# Patient Record
Sex: FEMALE | Race: WHITE | NOT HISPANIC OR LATINO | Employment: FULL TIME | URBAN - METROPOLITAN AREA
[De-identification: names, ages, dates, MRNs, and addresses within clinical notes are randomized per-mention and may not be internally consistent; named-entity substitution may affect disease eponyms.]

---

## 2018-06-27 ENCOUNTER — HOSPITAL ENCOUNTER (OUTPATIENT)
Dept: ULTRASOUND IMAGING | Facility: HOSPITAL | Age: 33
Discharge: HOME | End: 2018-06-27
Attending: OBSTETRICS & GYNECOLOGY | Admitting: OBSTETRICS & GYNECOLOGY

## 2018-07-31 ENCOUNTER — HOSPITAL ENCOUNTER (OUTPATIENT)
Dept: OTHER | Facility: HOSPITAL | Age: 33
Setting detail: SPECIMEN
Discharge: HOME | End: 2018-07-31
Attending: OBSTETRICS & GYNECOLOGY | Admitting: OBSTETRICS & GYNECOLOGY

## 2018-08-13 ENCOUNTER — CONVERSION ENCOUNTER (OUTPATIENT)
Dept: URGENT CARE | Facility: CLINIC | Age: 33
End: 2018-08-13

## 2018-08-13 ENCOUNTER — HOSPITAL ENCOUNTER (OUTPATIENT)
Dept: LAB | Facility: HOSPITAL | Age: 33
Discharge: HOME | End: 2018-08-13
Attending: INTERNAL MEDICINE | Admitting: INTERNAL MEDICINE

## 2018-08-13 LAB
ALBUMIN SERPL-MCNC: 4.7 G/DL (ref 3.5–4.8)
ALBUMIN/GLOB SERPL: 1.6 {RATIO} (ref 1–1.7)
ALP SERPL-CCNC: 50 IU/L (ref 32–91)
ALT SERPL-CCNC: 16 IU/L (ref 14–54)
ANION GAP SERPL CALC-SCNC: 12.7 MMOL/L (ref 10–20)
AST SERPL-CCNC: 21 IU/L (ref 15–41)
BASOPHILS # BLD AUTO: 0.1 10*3/UL (ref 0–0.2)
BASOPHILS NFR BLD AUTO: 1 % (ref 0–2)
BILIRUB SERPL-MCNC: 0.6 MG/DL (ref 0.3–1.2)
BUN SERPL-MCNC: 7 MG/DL (ref 8–20)
BUN/CREAT SERPL: 8.8 (ref 5.4–26.2)
CALCIUM SERPL-MCNC: 9.6 MG/DL (ref 8.9–10.3)
CHLORIDE SERPL-SCNC: 101 MMOL/L (ref 101–111)
CONV CO2: 28 MMOL/L (ref 22–32)
CONV TOTAL PROTEIN: 7.6 G/DL (ref 6.1–7.9)
CREAT UR-MCNC: 0.8 MG/DL (ref 0.4–1)
DIFFERENTIAL METHOD BLD: (no result)
EOSINOPHIL # BLD AUTO: 0.1 10*3/UL (ref 0–0.3)
EOSINOPHIL # BLD AUTO: 1 % (ref 0–3)
ERYTHROCYTE [DISTWIDTH] IN BLOOD BY AUTOMATED COUNT: 13 % (ref 11.5–14.5)
GLOBULIN UR ELPH-MCNC: 2.9 G/DL (ref 2.5–3.8)
GLUCOSE SERPL-MCNC: 99 MG/DL (ref 65–99)
HCT VFR BLD AUTO: 41.5 % (ref 35–49)
HGB BLD-MCNC: 14.2 G/DL (ref 12–15)
LYMPHOCYTES # BLD AUTO: 2.5 10*3/UL (ref 0.8–4.8)
LYMPHOCYTES NFR BLD AUTO: 35 % (ref 18–42)
MCH RBC QN AUTO: 30.2 PG (ref 26–32)
MCHC RBC AUTO-ENTMCNC: 34.3 G/DL (ref 32–36)
MCV RBC AUTO: 88 FL (ref 80–94)
MONOCYTES # BLD AUTO: 0.4 10*3/UL (ref 0.1–1.3)
MONOCYTES NFR BLD AUTO: 5 % (ref 2–11)
NEUTROPHILS # BLD AUTO: 4.2 10*3/UL (ref 2.3–8.6)
NEUTROPHILS NFR BLD AUTO: 58 % (ref 50–75)
NRBC BLD AUTO-RTO: 0 /100{WBCS}
NRBC/RBC NFR BLD MANUAL: 0 10*3/UL
PLATELET # BLD AUTO: 294 10*3/UL (ref 150–450)
PMV BLD AUTO: 8.9 FL (ref 7.4–10.4)
POTASSIUM SERPL-SCNC: 3.7 MMOL/L (ref 3.6–5.1)
RBC # BLD AUTO: 4.71 10*6/UL (ref 4–5.4)
SODIUM SERPL-SCNC: 138 MMOL/L (ref 136–144)
WBC # BLD AUTO: 7.3 10*3/UL (ref 4.5–11.5)

## 2018-08-23 ENCOUNTER — HOSPITAL ENCOUNTER (OUTPATIENT)
Dept: OTHER | Facility: HOSPITAL | Age: 33
Setting detail: SPECIMEN
Discharge: HOME | End: 2018-08-23
Attending: OBSTETRICS & GYNECOLOGY | Admitting: OBSTETRICS & GYNECOLOGY

## 2019-06-03 VITALS
SYSTOLIC BLOOD PRESSURE: 120 MMHG | HEIGHT: 65 IN | WEIGHT: 142 LBS | HEART RATE: 60 BPM | BODY MASS INDEX: 23.66 KG/M2 | DIASTOLIC BLOOD PRESSURE: 74 MMHG | OXYGEN SATURATION: 97 %

## 2019-11-04 PROBLEM — E55.9 VITAMIN D DEFICIENCY: Status: ACTIVE | Noted: 2018-08-13

## 2019-11-05 ENCOUNTER — OFFICE VISIT (OUTPATIENT)
Dept: PSYCHIATRY | Facility: CLINIC | Age: 34
End: 2019-11-05

## 2019-11-05 DIAGNOSIS — F33.0 MILD RECURRENT MAJOR DEPRESSION (HCC): ICD-10-CM

## 2019-11-05 DIAGNOSIS — F41.9 ANXIETY: ICD-10-CM

## 2019-11-05 DIAGNOSIS — F32.81 PMDD (PREMENSTRUAL DYSPHORIC DISORDER): Primary | ICD-10-CM

## 2019-11-05 DIAGNOSIS — R41.840 ATTENTION DEFICIT: ICD-10-CM

## 2019-11-05 PROCEDURE — 90792 PSYCH DIAG EVAL W/MED SRVCS: CPT | Performed by: PHYSICIAN ASSISTANT

## 2019-11-05 RX ORDER — LAMOTRIGINE 25 MG/1
25 TABLET ORAL 2 TIMES DAILY
Qty: 60 TABLET | Refills: 2 | Status: SHIPPED | OUTPATIENT
Start: 2019-11-05 | End: 2020-01-29

## 2019-11-05 NOTE — PROGRESS NOTES
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  Have had anxiety and depression, seasons of life but worse in the last 9 months  No SI  Wednesday women's group through [employer] has helped  Great , no financial issues  Sleeps well at night but has severe daytime fatigue, was given a trial of Provigil    The following portions of the patient's history were reviewed and updated as appropriate: allergies, current medications, past family history, past medical history, past social history, past surgical history and problem list.    PAST PSYCHIATRIC HISTORY  Axis I  Affective/Bipoloar Disorder, Anxiety/Panic Disorder  Axis II  None    PAST OUTPATIENT TREATMENT  Diagnosis treated:  Affective Disorder, Anxiety/Panic Disorder  Treatment Type:  Medication Management  Prior Psychiatric Medications:  Paxil, uncontrollable yawining  Prozac, uncontrollable yawining  Sarafem  Provigil, caused a rash  Support Groups:  Women's group  Sequelae Of Mental Disorder:  job disruption, social isolation, emotional distress      Interval History  No Change    Side Effects  None      Past Medical History:  Past Medical History:   Diagnosis Date   • Anxiety    • PMDD (premenstrual dysphoric disorder)        Social History:  Social History     Socioeconomic History   • Marital status:      Spouse name: Not on file   • Number of  children: Not on file   • Years of education: Not on file   • Highest education level: Not on file   Tobacco Use   • Smoking status: Never Smoker   • Smokeless tobacco: Never Used   Substance and Sexual Activity   • Alcohol use: Yes     Comment: social   • Drug use: No       Family History:  Family History   Problem Relation Age of Onset   • Depression Father    • Post-traumatic stress disorder Father    • Anxiety disorder Father        Past Surgical History:  History reviewed. No pertinent surgical history.    Problem List:  Patient Active Problem List   Diagnosis   • Vitamin D deficiency       Allergy:   Allergies   Allergen Reactions   • Penicillin G Rash        Discontinued Medications:  Medications Discontinued During This Encounter   Medication Reason   • FLUoxetine HCl, PMDD, 10 MG capsule Side effects       Current Medications:   Current Outpatient Medications   Medication Sig Dispense Refill   • azithromycin (ZITHROMAX) 250 MG tablet Take 2 tablets the first day, then 1 tablet daily for 4 days. 6 tablet 0   • lamoTRIgine (LAMICTAL) 25 MG tablet Take 1 tablet by mouth 2 (Two) Times a Day. 60 tablet 2   • predniSONE (DELTASONE) 20 MG tablet 2 po qd for 5 days 10 tablet 0   • promethazine-dextromethorphan (PROMETHAZINE-DM) 6.25-15 MG/5ML syrup Take 5 mL by mouth 4 (Four) Times a Day As Needed for Cough. 100 mL 0   • Vortioxetine HBr (TRINTELLIX) 5 MG tablet Take 5 mg by mouth Daily With Breakfast. 35 tablet 0     No current facility-administered medications for this visit.          Review of Symptoms:    Psychiatric/Behavioral: Negative for agitation, behavioral problems, confusion, hallucinations, self-injury, sleep disturbance and suicidal ideas. The patient is mildly nervous/anxious with decreased concentration and dysphoric mood but is not hyperactive.        Physical Exam:   not currently breastfeeding.    Mental Status Exam:   Hygiene:   good  Cooperation:  Cooperative  Eye Contact:  Good  Psychomotor  Behavior:  Appropriate  Affect:  Appropriate  Mood: anxious  Hopelessness: Denies  Speech:  Normal  Thought Process:  Goal directed  Thought Content:  Normal  Suicidal:  None  Homicidal:  None  Hallucinations:  None  Delusion:  None  Memory:  Intact  Orientation:  Person, Place, Time and Situation  Reliability:  good  Insight:  Good  Judgement:  Good  Impulse Control:  Good  Physical/Medical Issues:  No        PHQ-9 Depression Screening  Little interest or pleasure in doing things? 1   Feeling down, depressed, or hopeless? 1   Trouble falling or staying asleep, or sleeping too much? 0   Feeling tired or having little energy? 1   Poor appetite or overeating? 0   Feeling bad about yourself - or that you are a failure or have let yourself or your family down? 1   Trouble concentrating on things, such as reading the newspaper or watching television? 3   Moving or speaking so slowly that other people could have noticed? Or the opposite - being so fidgety or restless that you have been moving around a lot more than usual? 0   Thoughts that you would be better off dead, or of hurting yourself in some way? 0   PHQ-9 Total Score 7   If you checked off any problems, how difficult have these problems made it for you to do your work, take care of things at home, or get along with other people? Somewhat difficult           Never smoker    I advised Marjorie of the risks of tobacco use.     Lab Results:   No visits with results within 3 Month(s) from this visit.   Latest known visit with results is:   Hospital Outpatient Visit on 08/13/2018   Component Date Value Ref Range Status   • WBC 08/13/2018 7.3  4.5 - 11.5 10*3/uL Final   • RBC 08/13/2018 4.71  4.00 - 5.40 10*6/uL Final   • Hemoglobin 08/13/2018 14.2  12.0 - 15.0 g/dL Final   • Hematocrit 08/13/2018 41.5  35 - 49 % Final   • MCV 08/13/2018 88.0  80 - 94 fL Final   • MCH 08/13/2018 30.2  26 - 32 pg Final   • MCHC 08/13/2018 34.3  32 - 36 g/dL Final   • RDW 08/13/2018 13.0   11.5 - 14.5 % Final   • Platelets 08/13/2018 294  150 - 450 10*3/uL Final   • MPV 08/13/2018 8.9  7.4 - 10.4 fL Final   • Differential Type 08/13/2018 AUTO   Final   • Neutrophils Absolute 08/13/2018 4.2  2.3 - 8.6 10*3/uL Final   • Lymphocytes Absolute 08/13/2018 2.5  0.8 - 4.8 10*3/uL Final   • Monocytes Absolute 08/13/2018 0.4  0.1 - 1.3 10*3/uL Final   • Eosinophils Absolute 08/13/2018 0.1  0.0 - 0.3 10*3/uL Final   • Basophils Absolute 08/13/2018 0.1  0 - 0.2 10*3/uL Final   • Neutrophil Rel % 08/13/2018 58  50 - 75 % Final   • Lymphocyte Rel % 08/13/2018 35  18 - 42 % Final   • Monocyte Rel % 08/13/2018 5  2 - 11 % Final   • Eosinophil Rel % 08/13/2018 1  0 - 3 % Final   • Basophil Rel % 08/13/2018 1  0 - 2 % Final   • nRBC 08/13/2018 0  0 /100[WBCs] Final   • Absolute nRBC 08/13/2018 0  10*3/uL Final   • Sodium 08/13/2018 138  136 - 144 mmol/L Final   • Potassium 08/13/2018 3.7  3.6 - 5.1 mmol/L Final   • Chloride 08/13/2018 101  101 - 111 mmol/L Final   • CO2 08/13/2018 28  22 - 32 mmol/L Final   • Glucose 08/13/2018 99  65 - 99 mg/dL Final   • BUN 08/13/2018 7* 8 - 20 mg/dL Final   • Creatinine 08/13/2018 0.8  0.4 - 1.0 mg/dl Final   • Calcium 08/13/2018 9.6  8.9 - 10.3 mg/dL Final   • Total Protein 08/13/2018 7.6  6.1 - 7.9 g/dL Final   • Albumin 08/13/2018 4.7  3.5 - 4.8 g/dL Final   • Total Bilirubin 08/13/2018 0.6  0.3 - 1.2 mg/dL Final   • Alkaline Phosphatase 08/13/2018 50  32 - 91 IU/L Final   • AST (SGOT) 08/13/2018 21  15 - 41 IU/L Final   • ALT (SGPT) 08/13/2018 16  14 - 54 IU/L Final   • Anion Gap 08/13/2018 12.7  10 - 20 Final   • BUN/Creatinine Ratio 08/13/2018 8.8  5.4 - 26.2 Final   • GFR MDRD Non  08/13/2018 >60  >60 mL/min/1.73m2 Final   • GFR MDRD  08/13/2018 >60  >60 mL/min/1.73m2 Final   • Globulin 08/13/2018 2.9  2.5 - 3.8 G/dL Final   • A/G Ratio 08/13/2018 1.6  1.0 - 1.7 Final       Assessment/Plan   Problems Addressed this Visit     None      Visit  Diagnoses     PMDD (premenstrual dysphoric disorder)    -  Primary    Relevant Medications    lamoTRIgine (LAMICTAL) 25 MG tablet    Vortioxetine HBr (TRINTELLIX) 5 MG tablet    Mild recurrent major depression (CMS/HCC)        Relevant Medications    lamoTRIgine (LAMICTAL) 25 MG tablet    Vortioxetine HBr (TRINTELLIX) 5 MG tablet    Attention deficit        Anxiety        Relevant Medications    Vortioxetine HBr (TRINTELLIX) 5 MG tablet          Visit Diagnoses:    ICD-10-CM ICD-9-CM   1. PMDD (premenstrual dysphoric disorder) F32.81 625.4   2. Mild recurrent major depression (CMS/HCC) F33.0 296.31   3. Attention deficit R41.840 799.51   4. Anxiety F41.9 300.00       TREATMENT PLAN/GOALS: Continue supportive psychotherapy efforts and medications as indicated. Treatment and medication options discussed during today's visit. Patient ackowledged and verbally consented to continue with current treatment plan and was educated on the importance of compliance with treatment and follow-up appointments.    MEDICATION ISSUES:  INSPECT reviewed as expected  Discussed medication options and treatment plan of prescribed medication as well as the risks, benefits, and side effects including potential falls, possible impaired driving and metabolic adversities among others. Patient is agreeable to call the office with any worsening of symptoms or onset of side effects. Patient is agreeable to call 911 or go to the nearest ER should he/she begin having SI/HI. No medication side effects or related complaints today.     Patient has significant PMDD symptoms 7 days prior to her onset of menses.  She has had the same significant side effect with every SSRI, which is excessive yawning.  She has a lot of difficulty with attention and focus and staying on task, worry about losing her job.  Discussed having her tested for ADD, she will call the locations on a referral list to see if they take her insurance for neuropsych testing.  We can  send a referral if she finds some more to get it done.  Discussed [facility] testing in the future.  Trial of Trintellix 5 mg daily, call in 1 month with a progress report.  Trial of Lamictal 25 mg twice daily 10 days prior to her onset of menses.  Consider adding attention deficit medication in the future.    MEDS ORDERED DURING VISIT:  New Medications Ordered This Visit   Medications   • lamoTRIgine (LAMICTAL) 25 MG tablet     Sig: Take 1 tablet by mouth 2 (Two) Times a Day.     Dispense:  60 tablet     Refill:  2   • Vortioxetine HBr (TRINTELLIX) 5 MG tablet     Sig: Take 5 mg by mouth Daily With Breakfast.     Dispense:  35 tablet     Refill:  0     Samples given       Return in about 2 months (around 1/5/2020).         This document has been electronically signed by Elizabeth Damon PA-C  November 5, 2019 11:03 AM

## 2019-12-03 ENCOUNTER — TELEPHONE (OUTPATIENT)
Dept: PSYCHIATRY | Facility: CLINIC | Age: 34
End: 2019-12-03

## 2019-12-03 NOTE — TELEPHONE ENCOUNTER
Patient called stating this was her 4 week marilee that you asked her to call and let you know how the TRINTELLIX is doing for her.     She is stating that ever since she started the medicine she is having bad headaches every night starting about 7:00 pm on the left side of her head.    Her menstrual cycle she just had was horrific with severe cramps (as bad as beginning stages of labor), very nauseas and said she woke up having diarrhea 7 or 8 times the following morning.        She said she has been experiencing insomnia ever since taking it and no Sex drive.     She does not feel that it touched her anxiety at all.     The LAMICTAL is working fine for her.

## 2019-12-04 NOTE — TELEPHONE ENCOUNTER
Then tell her to stop the Trintellix and try the Lamictal alone.  The Lamictal dosage can be increased if needed.

## 2019-12-04 NOTE — TELEPHONE ENCOUNTER
Notified patient to stop trintellix and and keep taking lamotrigine and can be increased if needed

## 2020-01-29 DIAGNOSIS — F32.81 PMDD (PREMENSTRUAL DYSPHORIC DISORDER): ICD-10-CM

## 2020-01-29 DIAGNOSIS — F33.0 MILD RECURRENT MAJOR DEPRESSION (HCC): ICD-10-CM

## 2020-01-29 RX ORDER — LAMOTRIGINE 25 MG/1
TABLET ORAL
Qty: 60 TABLET | Refills: 2 | Status: SHIPPED | OUTPATIENT
Start: 2020-01-29 | End: 2020-07-27 | Stop reason: SDUPTHER

## 2020-04-01 ENCOUNTER — TELEMEDICINE (OUTPATIENT)
Dept: PSYCHIATRY | Facility: CLINIC | Age: 35
End: 2020-04-01

## 2020-04-01 DIAGNOSIS — F32.81 PMDD (PREMENSTRUAL DYSPHORIC DISORDER): ICD-10-CM

## 2020-04-01 DIAGNOSIS — F41.9 ANXIETY: Primary | ICD-10-CM

## 2020-04-01 PROCEDURE — 99213 OFFICE O/P EST LOW 20 MIN: CPT | Performed by: PHYSICIAN ASSISTANT

## 2020-04-01 RX ORDER — CLONAZEPAM 0.5 MG/1
0.5 TABLET ORAL DAILY PRN
Qty: 30 TABLET | Refills: 1 | Status: SHIPPED | OUTPATIENT
Start: 2020-04-01 | End: 2020-06-22

## 2020-04-01 NOTE — PROGRESS NOTES
Subjective   Subjective   Marjorie Montague is a 34 y.o.white female who presents today for follow up via video visit    Chief Complaint:  Anxiety, depression, PMDD    History of Present Illness:   Trintellix 5mg gave her severe HA and had to stop it, but still taking the Lamictal before her menses and duriing  She still works at [employer] (PT), anxiety increased due to COVID, near panic attacks  GYN, Dr Prieto, referred her here for severe PMDD, starts one week before period, irritable/hunter and then gets better once she starts  Irrational thoughts during PMS   Horrible focus, depressed, overwhelmed  Depression 5/10  Anxiety 8/10 at times  Denies SI/HI    Wednesday women's group through [employer] has helped  Great , no financial issues  Sleeps well at night but has severe daytime fatigue, was given a trial of Provigil      PAST PSYCHIATRIC HISTORY  Axis I  Affective/Bipoloar Disorder, Anxiety/Panic Disorder  Axis II  None    PAST OUTPATIENT TREATMENT  Diagnosis treated:  Affective Disorder, Anxiety/Panic Disorder  Treatment Type:  Medication Management  Prior Psychiatric Medications:  Paxil, uncontrollable yawining  Prozac, uncontrollable yawining  Sarafem  Provigil, caused a rash  Trintelllix caused headaches and   Support Groups:  Women's group  Sequelae Of Mental Disorder:  job disruption, social isolation, emotional distress      Interval History  No Change    Side Effects  None    Past psychiatric history was reviewed and compared to 11/5/19 visit and appropriate updates were made.    Past Medical History:  Past Medical History:   Diagnosis Date   • Anxiety    • PMDD (premenstrual dysphoric disorder)        Social History:  Social History     Socioeconomic History   • Marital status:      Spouse name: Not on file   • Number of children: Not on file   • Years of education: Not on file   • Highest education level: Not on file   Tobacco Use   • Smoking status: Never Smoker   • Smokeless tobacco: Never  Used   Substance and Sexual Activity   • Alcohol use: Yes     Alcohol/week: 2.0 standard drinks     Types: 2 Glasses of wine per week     Comment: social   • Drug use: No   • Sexual activity: Yes     Partners: Male     Birth control/protection: Surgical       Family History:  Family History   Problem Relation Age of Onset   • Depression Father    • Post-traumatic stress disorder Father    • Anxiety disorder Father        Past Surgical History:  History reviewed. No pertinent surgical history.    Problem List:  Patient Active Problem List   Diagnosis   • Vitamin D deficiency       Allergy:   Allergies   Allergen Reactions   • Penicillin G Rash        Discontinued Medications:  Medications Discontinued During This Encounter   Medication Reason   • Vortioxetine HBr (TRINTELLIX) 5 MG tablet Side effects       Current Medications:   Current Outpatient Medications   Medication Sig Dispense Refill   • azithromycin (ZITHROMAX) 250 MG tablet Take 2 tablets the first day, then 1 tablet daily for 4 days. 6 tablet 0   • clonazePAM (KlonoPIN) 0.5 MG tablet Take 1 tablet by mouth Daily As Needed for Anxiety (Panic attack). 30 tablet 1   • lamoTRIgine (LaMICtal) 25 MG tablet TAKE 1 TABLET BY MOUTH TWICE DAILY 60 tablet 2   • predniSONE (DELTASONE) 20 MG tablet 2 po qd for 5 days 10 tablet 0   • promethazine-dextromethorphan (PROMETHAZINE-DM) 6.25-15 MG/5ML syrup Take 5 mL by mouth 4 (Four) Times a Day As Needed for Cough. 100 mL 0     No current facility-administered medications for this visit.          Review of Symptoms:    Psychiatric/Behavioral: Negative for agitation, behavioral problems, confusion, hallucinations, self-injury, sleep disturbance and suicidal ideas. The patient is mildly nervous/anxious with decreased concentration and dysphoric mood but is not hyperactive.        Physical Exam:   not currently breastfeeding.    Mental Status Exam:   Hygiene:   good  Cooperation:  Cooperative  Eye Contact:  Good  Psychomotor  Behavior:  Appropriate  Affect:  Appropriate  Mood: anxious  Hopelessness: Denies  Speech:  Normal  Thought Process:  Goal directed  Thought Content:  Normal  Suicidal:  None  Homicidal:  None  Hallucinations:  None  Delusion:  None  Memory:  Intact  Orientation:  Person, Place, Time and Situation  Reliability:  good  Insight:  Good  Judgement:  Good  Impulse Control:  Good  Physical/Medical Issues:  No      Mental status exam was reviewed and compared to 11/5/2029 visit and no changes were necessary, the exam was the same.    PHQ-9 Depression Screening  Little interest or pleasure in doing things?  1   Feeling down, depressed, or hopeless?  1   Trouble falling or staying asleep, or sleeping too much?  0   Feeling tired or having little energy?  0   Poor appetite or overeating?  0   Feeling bad about yourself - or that you are a failure or have let yourself or your family down?  1   Trouble concentrating on things, such as reading the newspaper or watching television?  2   Moving or speaking so slowly that other people could have noticed? Or the opposite - being so fidgety or restless that you have been moving around a lot more than usual?  0   Thoughts that you would be better off dead, or of hurting yourself in some way?  0   PHQ-9 Total Score  5   If you checked off any problems, how difficult have these problems made it for you to do your work, take care of things at home, or get along with other people?  Somewhat difficult           Never smoker    I advised Marjorie of the risks of tobacco use.     Lab Results:   No visits with results within 3 Month(s) from this visit.   Latest known visit with results is:   No results found for any previous visit.       Assessment/Plan   Problems Addressed this Visit     None      Visit Diagnoses     Anxiety    -  Primary    Relevant Medications    clonazePAM (KlonoPIN) 0.5 MG tablet    PMDD (premenstrual dysphoric disorder)        Relevant Medications    clonazePAM (KlonoPIN)  0.5 MG tablet          Visit Diagnoses:    ICD-10-CM ICD-9-CM   1. Anxiety F41.9 300.00   2. PMDD (premenstrual dysphoric disorder) F32.81 625.4       TREATMENT PLAN/GOALS: Continue supportive psychotherapy efforts and medications as indicated. Treatment and medication options discussed during today's visit. Patient ackowledged and verbally consented to continue with current treatment plan and was educated on the importance of compliance with treatment and follow-up appointments.    MEDICATION ISSUES:  INSPECT reviewed as expected  Discussed medication options and treatment plan of prescribed medication as well as the risks, benefits, and side effects including potential falls, possible impaired driving and metabolic adversities among others. Patient is agreeable to call the office with any worsening of symptoms or onset of side effects. Patient is agreeable to call 911 or go to the nearest ER should he/she begin having SI/HI. No medication side effects or related complaints today.     Patient has significant PMDD symptoms 7 days prior to her onset of menses.  She has had the same significant side effect with every SSRI, which is excessive yawning.  Patient has had significantly increased anxiety with COVID.  Has had 2 previous for her anxiety, near panic attacks.  She could not tolerate the Trintellix.  Increase Lamictal to 50 mg twice daily before menses and during, no refill needed today.  Trial of Klonopin 0.5 mg once daily as needed for anxiety, panic attack.    MEDS ORDERED DURING VISIT:  New Medications Ordered This Visit   Medications   • clonazePAM (KlonoPIN) 0.5 MG tablet     Sig: Take 1 tablet by mouth Daily As Needed for Anxiety (Panic attack).     Dispense:  30 tablet     Refill:  1       Return in about 2 months (around 6/1/2020).         This document has been electronically signed by Elizabeth Damon PA-C  April 1, 2020 08:33

## 2020-04-01 NOTE — PATIENT INSTRUCTIONS
Panic Attack    A panic attack is when you suddenly feel very afraid, uncomfortable, or nervous (anxious). A panic attack can happen when you are scared or for no reason.  A panic attack can feel like a serious problem. It can even feel like a heart attack or stroke. See your doctor when you have a panic attack to make sure you do not have a serious problem.  Follow these instructions at home:  · Take medicines only as told by your doctor.  · If you feel worried or nervous, try not to have caffeine.  · Take good care of your health. To do this:  ? Eat healthy. Make sure to eat fresh fruits and vegetables, whole grains, lean meats, and low-fat dairy.  ? Get enough sleep. Try to sleep for 7-8 hours each night.  ? Exercise. Try to be active for 30 minutes 5 or more days a week.  ? Do not smoke. Talk to your doctor if you need help quitting.  ? Limit how much alcohol you drink:  § If you are a woman who is not pregnant: try not to have more than 1 drink a day.  § If you are a man: try not to have more than 2 drinks a day.  § One drink equals 12 oz of beer, 5 oz of wine, or 1½ oz of hard liquor.  · Keep all follow-up visits as told by your doctor. This is important.  Contact a doctor if:  · Your symptoms do not get better.  · Your symptoms get worse.  · You are not able to take your medicines as told.  Get help right away if:  · You have thoughts of hurting yourself or others.  · You have symptoms of a panic attack. Do not drive yourself to the hospital. Have someone else drive you or call an ambulance.  If you feel like you may hurt yourself or others, or have thoughts about taking your own life, get help right away. You can go to your nearest emergency department or call:  · Your local emergency services (911 in the [place]  · A suicide crisis helpline, such as the National Suicide Prevention Lifeline at 1-669.596.4265. This is open 24 hours a day.  Summary  · A panic attack is when you suddenly feel very afraid,  uncomfortable, or nervous (anxious).  · See your doctor when you have a panic attack to make sure that you do not have another serious problem.  · If you feel like you may hurt yourself or others, get help right away by calling 911.  This information is not intended to replace advice given to you by your health care provider. Make sure you discuss any questions you have with your health care provider.  Document Released: 01/20/2012 Document Revised: 01/31/2018 Document Reviewed: 01/31/2018  [employer]vier Interactive Patient Education © 2020 [employer]

## 2020-06-20 ENCOUNTER — TELEPHONE (OUTPATIENT)
Dept: PSYCHIATRY | Facility: CLINIC | Age: 35
End: 2020-06-20

## 2020-06-20 DIAGNOSIS — F32.81 PMDD (PREMENSTRUAL DYSPHORIC DISORDER): ICD-10-CM

## 2020-06-20 DIAGNOSIS — F41.9 ANXIETY: ICD-10-CM

## 2020-06-22 RX ORDER — CLONAZEPAM 0.5 MG/1
TABLET ORAL
Qty: 30 TABLET | Refills: 0 | Status: SHIPPED | OUTPATIENT
Start: 2020-06-22 | End: 2020-08-18 | Stop reason: SDUPTHER

## 2020-07-27 DIAGNOSIS — F33.0 MILD RECURRENT MAJOR DEPRESSION (HCC): ICD-10-CM

## 2020-07-27 DIAGNOSIS — F32.81 PMDD (PREMENSTRUAL DYSPHORIC DISORDER): ICD-10-CM

## 2020-07-27 RX ORDER — LAMOTRIGINE 25 MG/1
25 TABLET ORAL 2 TIMES DAILY
Qty: 60 TABLET | Refills: 2 | Status: SHIPPED | OUTPATIENT
Start: 2020-07-27 | End: 2020-08-18 | Stop reason: DRUGHIGH

## 2020-08-18 ENCOUNTER — OFFICE VISIT (OUTPATIENT)
Dept: PSYCHIATRY | Facility: CLINIC | Age: 35
End: 2020-08-18

## 2020-08-18 DIAGNOSIS — F32.81 PMDD (PREMENSTRUAL DYSPHORIC DISORDER): Primary | ICD-10-CM

## 2020-08-18 DIAGNOSIS — F41.9 ANXIETY: ICD-10-CM

## 2020-08-18 DIAGNOSIS — R41.840 ATTENTION DEFICIT: ICD-10-CM

## 2020-08-18 DIAGNOSIS — F33.0 MILD RECURRENT MAJOR DEPRESSION (HCC): ICD-10-CM

## 2020-08-18 PROCEDURE — 99443 PR PHYS/QHP TELEPHONE EVALUATION 21-30 MIN: CPT | Performed by: PHYSICIAN ASSISTANT

## 2020-08-18 RX ORDER — CLONAZEPAM 0.5 MG/1
0.5 TABLET ORAL 2 TIMES DAILY PRN
Qty: 60 TABLET | Refills: 2 | Status: SHIPPED | OUTPATIENT
Start: 2020-08-18 | End: 2020-12-30

## 2020-08-18 RX ORDER — BUPROPION HYDROCHLORIDE 150 MG/1
150 TABLET ORAL EVERY MORNING
Qty: 30 TABLET | Refills: 2 | Status: SHIPPED | OUTPATIENT
Start: 2020-08-18 | End: 2020-09-14 | Stop reason: SDUPTHER

## 2020-08-18 RX ORDER — LAMOTRIGINE 100 MG/1
50 TABLET ORAL 2 TIMES DAILY
Qty: 180 TABLET | Refills: 1 | Status: SHIPPED | OUTPATIENT
Start: 2020-08-18 | End: 2022-02-14

## 2020-08-18 NOTE — PROGRESS NOTES
Subjective   Subjective   Marjorie Montague is a 34 y.o.white female who presents today for follow up    You have chosen to receive care through a telephone visit. Do you consent to use a telephone visit for your medical care today? Yes    Chief Complaint:  Anxiety, depression, PMDD    History of Present Illness:   Works at [employer] on [place] unit  Kids are back in school  Moved in June so stress with that but now in dream home  Low motivation and exhaustion, trouble with focus, stll wants to get tested for ADD  Mood swings around menses are improved with lamictal  Depression 4/10  Anxiety 7/10  Denies SI/HI  Wednesday women's group through [employer] has helped  Great , no financial issues  Sleeps well at night but has severe daytime fatigue, was given a trial of Provigil      PAST PSYCHIATRIC HISTORY  Axis I  Affective/Bipoloar Disorder, Anxiety/Panic Disorder  Axis II  None    PAST OUTPATIENT TREATMENT  Diagnosis treated:  Affective Disorder, Anxiety/Panic Disorder  Treatment Type:  Medication Management  Prior Psychiatric Medications:  Paxil, uncontrollable yawining  Prozac, uncontrollable yawining  Sarafem  Provigil, caused a rash  Trintelllix caused headaches   Lamictal  Klonopin  Support Groups:  Women's group  Sequelae Of Mental Disorder:  job disruption, social isolation, emotional distress      Interval History  No Change    Side Effects  None    Past psychiatric history was reviewed and compared to 4/1/20 visit and appropriate updates were made.    Past Medical History:  Past Medical History:   Diagnosis Date   • Anxiety    • PMDD (premenstrual dysphoric disorder)        Social History:  Social History     Socioeconomic History   • Marital status:      Spouse name: Not on file   • Number of children: Not on file   • Years of education: Not on file   • Highest education level: Not on file   Tobacco Use   • Smoking status: Never Smoker   • Smokeless tobacco: Never Used   Substance and Sexual Activity    • Alcohol use: Yes     Alcohol/week: 2.0 standard drinks     Types: 2 Glasses of wine per week     Comment: social   • Drug use: No   • Sexual activity: Yes     Partners: Male     Birth control/protection: Surgical       Family History:  Family History   Problem Relation Age of Onset   • Depression Father    • Post-traumatic stress disorder Father    • Anxiety disorder Father        Past Surgical History:  History reviewed. No pertinent surgical history.    Problem List:  Patient Active Problem List   Diagnosis   • Vitamin D deficiency       Allergy:   Allergies   Allergen Reactions   • Penicillin G Rash        Discontinued Medications:  Medications Discontinued During This Encounter   Medication Reason   • lamoTRIgine (LaMICtal) 25 MG tablet Dose adjustment   • clonazePAM (KlonoPIN) 0.5 MG tablet Reorder       Current Medications:   Current Outpatient Medications   Medication Sig Dispense Refill   • azithromycin (ZITHROMAX) 250 MG tablet Take 2 tablets the first day, then 1 tablet daily for 4 days. 6 tablet 0   • buPROPion XL (Wellbutrin XL) 150 MG 24 hr tablet Take 1 tablet by mouth Every Morning. 30 tablet 2   • clonazePAM (KlonoPIN) 0.5 MG tablet Take 1 tablet by mouth 2 (Two) Times a Day As Needed for Seizures. 60 tablet 2   • lamoTRIgine (LaMICtal) 100 MG tablet Take 0.5 tablets by mouth 2 (Two) Times a Day. 180 tablet 1   • predniSONE (DELTASONE) 20 MG tablet 2 po qd for 5 days 10 tablet 0   • promethazine-dextromethorphan (PROMETHAZINE-DM) 6.25-15 MG/5ML syrup Take 5 mL by mouth 4 (Four) Times a Day As Needed for Cough. 100 mL 0     No current facility-administered medications for this visit.          Review of Symptoms:    Psychiatric/Behavioral: Negative for agitation, behavioral problems, confusion, hallucinations, self-injury, sleep disturbance and suicidal ideas. The patient is mildly nervous/anxious with decreased concentration and dysphoric mood but is not hyperactive.        Physical Exam:   not  currently breastfeeding.    Mental Status Exam:   Hygiene:   Unable to assess via telephone  Cooperation:  Cooperative  Eye Contact: No eye contact via telephone   Psychomotor Behavior:  Appropriate  Affect:  Appropriate  Mood: anxious  Hopelessness: Denies  Speech:  Normal  Thought Process:  Goal directed  Thought Content:  Normal  Suicidal:  None  Homicidal:  None  Hallucinations:  None  Delusion:  None  Memory:  Intact  Orientation:  Person, Place, Time and Situation  Reliability:  good  Insight:  Good  Judgement:  Good  Impulse Control:  Good  Physical/Medical Issues:  No      Mental status exam was reviewed and compared to 4/1/20 visit and appropriate updates were made.    PHQ-9 Depression Screening  Little interest or pleasure in doing things? 1   Feeling down, depressed, or hopeless? 1   Trouble falling or staying asleep, or sleeping too much? 0   Feeling tired or having little energy? 1   Poor appetite or overeating? 0   Feeling bad about yourself - or that you are a failure or have let yourself or your family down? 1   Trouble concentrating on things, such as reading the newspaper or watching television? 1   Moving or speaking so slowly that other people could have noticed? Or the opposite - being so fidgety or restless that you have been moving around a lot more than usual? 0   Thoughts that you would be better off dead, or of hurting yourself in some way? 0   PHQ-9 Total Score 5   If you checked off any problems, how difficult have these problems made it for you to do your work, take care of things at home, or get along with other people? Somewhat difficult           Never smoker    I advised Marjorie of the risks of tobacco use.     Lab Results:   No visits with results within 3 Month(s) from this visit.   Latest known visit with results is:   No results found for any previous visit.       Assessment/Plan   Problems Addressed this Visit     None      Visit Diagnoses     PMDD (premenstrual dysphoric  disorder)    -  Primary    Relevant Medications    lamoTRIgine (LaMICtal) 100 MG tablet    clonazePAM (KlonoPIN) 0.5 MG tablet    buPROPion XL (Wellbutrin XL) 150 MG 24 hr tablet    Anxiety        Relevant Medications    clonazePAM (KlonoPIN) 0.5 MG tablet    Attention deficit        Mild recurrent major depression (CMS/HCC)        Relevant Medications    buPROPion XL (Wellbutrin XL) 150 MG 24 hr tablet          Visit Diagnoses:    ICD-10-CM ICD-9-CM   1. PMDD (premenstrual dysphoric disorder) F32.81 625.4   2. Anxiety F41.9 300.00   3. Attention deficit R41.840 799.51   4. Mild recurrent major depression (CMS/HCC) F33.0 296.31       TREATMENT PLAN/GOALS: Continue supportive psychotherapy efforts and medications as indicated. Treatment and medication options discussed during today's visit. Patient ackowledged and verbally consented to continue with current treatment plan and was educated on the importance of compliance with treatment and follow-up appointments.    MEDICATION ISSUES:  INSPECT reviewed as expected  Discussed medication options and treatment plan of prescribed medication as well as the risks, benefits, and side effects including potential falls, possible impaired driving and metabolic adversities among others. Patient is agreeable to call the office with any worsening of symptoms or onset of side effects. Patient is agreeable to call 911 or go to the nearest ER should he/she begin having SI/HI. No medication side effects or related complaints today.     Patient has significant PMDD symptoms 7 days prior to her onset of menses.  She has had the same significant side effect with every SSRI, which is excessive yawning.  Lamictal 50 mg twice daily before menses and during, no refill needed today.  Change Klonopin 0.5 mg to Bid prn   Trial of Wellbutrin XL 150mg every morning to help mood and focus, can increase to 300 mg in a month if needed.    MEDS ORDERED DURING VISIT:  New Medications Ordered This  Visit   Medications   • lamoTRIgine (LaMICtal) 100 MG tablet     Sig: Take 0.5 tablets by mouth 2 (Two) Times a Day.     Dispense:  180 tablet     Refill:  1   • clonazePAM (KlonoPIN) 0.5 MG tablet     Sig: Take 1 tablet by mouth 2 (Two) Times a Day As Needed for Seizures.     Dispense:  60 tablet     Refill:  2   • buPROPion XL (Wellbutrin XL) 150 MG 24 hr tablet     Sig: Take 1 tablet by mouth Every Morning.     Dispense:  30 tablet     Refill:  2       Return in about 3 months (around 11/18/2020).    This visit has been rescheduled as a phone visit to comply with patient safety concerns in accordance with CDC recommendations. Total time of discussion was 25 minutes.    This document has been electronically signed by Elizabeth Damon PA-C  August 18, 2020 10:19

## 2020-09-14 DIAGNOSIS — F32.81 PMDD (PREMENSTRUAL DYSPHORIC DISORDER): ICD-10-CM

## 2020-09-14 RX ORDER — BUPROPION HYDROCHLORIDE 150 MG/1
150 TABLET ORAL EVERY MORNING
Qty: 90 TABLET | Refills: 1 | Status: SHIPPED | OUTPATIENT
Start: 2020-09-14 | End: 2021-04-21 | Stop reason: SDUPTHER

## 2020-09-14 NOTE — TELEPHONE ENCOUNTER
REFILL FOR BUPROPION  MG WAS SENT TO PHARMACY ON 8/18/20 WITH REFILL- I CALLED PHARMACY TO VERIFY PER MISHA OCHOA ) AT [facility]- THEY NEVER RECEIVED IT. PLEASE RESEND.

## 2020-11-09 DIAGNOSIS — F33.0 MILD RECURRENT MAJOR DEPRESSION (HCC): ICD-10-CM

## 2020-11-09 DIAGNOSIS — F32.81 PMDD (PREMENSTRUAL DYSPHORIC DISORDER): ICD-10-CM

## 2020-11-09 RX ORDER — LAMOTRIGINE 25 MG/1
TABLET ORAL
Qty: 60 TABLET | Refills: 1 | OUTPATIENT
Start: 2020-11-09

## 2020-11-10 PROCEDURE — U0003 INFECTIOUS AGENT DETECTION BY NUCLEIC ACID (DNA OR RNA); SEVERE ACUTE RESPIRATORY SYNDROME CORONAVIRUS 2 (SARS-COV-2) (CORONAVIRUS DISEASE [COVID-19]), AMPLIFIED PROBE TECHNIQUE, MAKING USE OF HIGH THROUGHPUT TECHNOLOGIES AS DESCRIBED BY CMS-2020-01-R: HCPCS | Performed by: EMERGENCY MEDICINE

## 2020-11-30 ENCOUNTER — OFFICE VISIT (OUTPATIENT)
Dept: PSYCHIATRY | Facility: CLINIC | Age: 35
End: 2020-11-30

## 2020-11-30 DIAGNOSIS — F41.9 ANXIETY: ICD-10-CM

## 2020-11-30 DIAGNOSIS — F33.0 MILD RECURRENT MAJOR DEPRESSION (HCC): Primary | ICD-10-CM

## 2020-11-30 DIAGNOSIS — F32.81 PMDD (PREMENSTRUAL DYSPHORIC DISORDER): ICD-10-CM

## 2020-11-30 PROCEDURE — 99443 PR PHYS/QHP TELEPHONE EVALUATION 21-30 MIN: CPT | Performed by: PHYSICIAN ASSISTANT

## 2020-11-30 NOTE — PATIENT INSTRUCTIONS

## 2020-11-30 NOTE — PROGRESS NOTES
Subjective   Subjective   Marjorie Montague is a 35 y.o.white female who presents today for follow up    You have chosen to receive care through a telephone visit. Do you consent to use a telephone visit for your medical care today? Yes    Chief Complaint:  Anxiety, depression, PMDD    History of Present Illness:   Works at [employer], now intermittently on COVID unit  Having a partial hysterectomy in March, high grade cervical dysplasia  Loves being in her dream home  Motivation better with Wellbutrin  Mood swings around menses are improved with lamictal, some days only takes AM dose  Depression 1/10  Anxiety 5/10, most days only takes one klonopin  Denies SI/HI  Wednesday women's group through [facility] has helped  Great , no financial issues      PAST PSYCHIATRIC HISTORY  Axis I  Affective/Bipoloar Disorder, Anxiety/Panic Disorder  Axis II  None    PAST OUTPATIENT TREATMENT  Diagnosis treated:  Affective Disorder, Anxiety/Panic Disorder  Treatment Type:  Medication Management  Prior Psychiatric Medications:  Paxil, uncontrollable yawining  Prozac, uncontrollable yawining  Sarafem  Provigil, caused a rash  Trintelllix caused headaches   Lamictal  Klonopin  Wellbutrin  Support Groups:  Women's group  Sequelae Of Mental Disorder:  job disruption, social isolation, emotional distress      Interval History  No Change    Side Effects  None    Past psychiatric history was reviewed and compared to 8/18/20 visit and appropriate updates were made.    Past Medical History:  Past Medical History:   Diagnosis Date   • Anxiety    • PMDD (premenstrual dysphoric disorder)        Social History:  Social History     Socioeconomic History   • Marital status:      Spouse name: Not on file   • Number of children: Not on file   • Years of education: Not on file   • Highest education level: Not on file   Tobacco Use   • Smoking status: Never Smoker   • Smokeless tobacco: Never Used   Substance and Sexual Activity   • Alcohol use:  Yes     Alcohol/week: 2.0 standard drinks     Types: 2 Glasses of wine per week     Comment: social   • Drug use: No   • Sexual activity: Yes     Partners: Male     Birth control/protection: Surgical       Family History:  Family History   Problem Relation Age of Onset   • Depression Father    • Post-traumatic stress disorder Father    • Anxiety disorder Father        Past Surgical History:  History reviewed. No pertinent surgical history.    Problem List:  Patient Active Problem List   Diagnosis   • Vitamin D deficiency       Allergy:   Allergies   Allergen Reactions   • Penicillin G Rash        Discontinued Medications:  There are no discontinued medications.    Current Medications:   Current Outpatient Medications   Medication Sig Dispense Refill   • azithromycin (Zithromax) 250 MG tablet Take 2 tablets the first day, then 1 tablet daily for 4 days. 6 tablet 0   • buPROPion XL (Wellbutrin XL) 150 MG 24 hr tablet Take 1 tablet by mouth Every Morning. 90 tablet 1   • clonazePAM (KlonoPIN) 0.5 MG tablet Take 1 tablet by mouth 2 (Two) Times a Day As Needed for Seizures. 60 tablet 2   • lamoTRIgine (LaMICtal) 100 MG tablet Take 0.5 tablets by mouth 2 (Two) Times a Day. 180 tablet 1   • promethazine-dextromethorphan (PROMETHAZINE-DM) 6.25-15 MG/5ML syrup Take 5 mL by mouth 4 (Four) Times a Day As Needed for Cough. 100 mL 0     No current facility-administered medications for this visit.          Review of Symptoms:    Psychiatric/Behavioral: Negative for agitation, behavioral problems, confusion, hallucinations, self-injury, sleep disturbance and suicidal ideas. The patient is mildly nervous/anxious with decreased concentration and dysphoric mood but is not hyperactive.        Physical Exam:   not currently breastfeeding.    Mental Status Exam:   Hygiene:   Unable to assess via telephone  Cooperation:  Cooperative  Eye Contact: No eye contact via telephone   Psychomotor Behavior:  Appropriate  Affect:   Appropriate  Mood: Normal  Hopelessness: Denies  Speech:  Normal  Thought Process:  Goal directed  Thought Content:  Normal  Suicidal:  None  Homicidal:  None  Hallucinations:  None  Delusion:  None  Memory:  Intact  Orientation:  Person, Place, Time and Situation  Reliability:  good  Insight:  Good  Judgement:  Good  Impulse Control:  Good  Physical/Medical Issues:  No      Mental status exam was reviewed and compared to 8/18/20 visit and appropriate updates were made.    PHQ-9 Depression Screening  Little interest or pleasure in doing things? 0   Feeling down, depressed, or hopeless? 1   Trouble falling or staying asleep, or sleeping too much?     Feeling tired or having little energy?     Poor appetite or overeating?     Feeling bad about yourself - or that you are a failure or have let yourself or your family down?     Trouble concentrating on things, such as reading the newspaper or watching television?     Moving or speaking so slowly that other people could have noticed? Or the opposite - being so fidgety or restless that you have been moving around a lot more than usual?     Thoughts that you would be better off dead, or of hurting yourself in some way?     PHQ-9 Total Score 1   If you checked off any problems, how difficult have these problems made it for you to do your work, take care of things at home, or get along with other people?             Never smoker    I advised Marjorie of the risks of tobacco use.     Lab Results:   Admission on 11/10/2020, Discharged on 11/10/2020   Component Date Value Ref Range Status   • SARS-CoV-2, [place] 11/10/2020 Not Detected  Not Detected Final    Comment: This nucleic acid amplification test was developed and its performance  characteristics determined by [employer]. Nucleic acid  amplification tests include PCR and TMA. This test has not been FDA  cleared or approved. This test has been authorized by FDA under an  Emergency Use Authorization (EUA). This test is  only authorized for  the duration of time the declaration that circumstances exist  justifying the authorization of the emergency use of in vitro  diagnostic tests for detection of SARS-CoV-2 virus and/or diagnosis  of COVID-19 infection under section 564(b)(1) of the Act, 21 U.S.C.  360bbb-3(b) (1), unless the authorization is terminated or revoked  sooner.  When diagnostic testing is negative, the possibility of a false  negative result should be considered in the context of a patient's  recent exposures and the presence of clinical signs and symptoms  consistent with COVID-19. An individual without symptoms of COVID-19  and who is not shedding SARS-CoV-2 virus would                            expect to have a  negative (not detected) result in this assay.   • COVID [facility] PRIORITY 11/10/2020 Comment   Final    Received       Assessment/Plan   Problems Addressed this Visit     None      Visit Diagnoses     Mild recurrent major depression (CMS/HCC)    -  Primary    PMDD (premenstrual dysphoric disorder)        Anxiety          Diagnoses       Codes Comments    Mild recurrent major depression (CMS/HCC)    -  Primary ICD-10-CM: F33.0  ICD-9-CM: 296.31     PMDD (premenstrual dysphoric disorder)     ICD-10-CM: F32.81  ICD-9-CM: 625.4     Anxiety     ICD-10-CM: F41.9  ICD-9-CM: 300.00           Visit Diagnoses:    ICD-10-CM ICD-9-CM   1. Mild recurrent major depression (CMS/HCC)  F33.0 296.31   2. PMDD (premenstrual dysphoric disorder)  F32.81 625.4   3. Anxiety  F41.9 300.00       TREATMENT PLAN/GOALS: Continue supportive psychotherapy efforts and medications as indicated. Treatment and medication options discussed during today's visit. Patient ackowledged and verbally consented to continue with current treatment plan and was educated on the importance of compliance with treatment and follow-up appointments.    MEDICATION ISSUES:  INSPECT reviewed as expected  Discussed medication options and treatment plan of  prescribed medication as well as the risks, benefits, and side effects including potential falls, possible impaired driving and metabolic adversities among others. Patient is agreeable to call the office with any worsening of symptoms or onset of side effects. Patient is agreeable to call 911 or go to the nearest ER should he/she begin having SI/HI. No medication side effects or related complaints today.     Patient has significant PMDD symptoms 7 days prior to her onset of menses.  She has had the same significant side effect with every SSRI, which is excessive yawning.  Patient is doing very well on her current meds, continue Wellbutrin, Lamictal and Klonopin at current dosages, with no changes, no refills needed today.    MEDS ORDERED DURING VISIT:  No orders of the defined types were placed in this encounter.      Return in about 6 months (around 5/30/2021).    This visit has been rescheduled as a phone visit to comply with patient safety concerns in accordance with CDC recommendations. Total time of discussion was 25 minutes.    This document has been electronically signed by Elizabeth Damon PA-C  November 30, 2020 08:33 EST

## 2020-12-30 ENCOUNTER — PATIENT MESSAGE (OUTPATIENT)
Dept: PSYCHIATRY | Facility: CLINIC | Age: 35
End: 2020-12-30

## 2020-12-30 DIAGNOSIS — F32.81 PMDD (PREMENSTRUAL DYSPHORIC DISORDER): ICD-10-CM

## 2020-12-30 DIAGNOSIS — F33.0 MILD RECURRENT MAJOR DEPRESSION (HCC): ICD-10-CM

## 2020-12-30 DIAGNOSIS — F41.9 ANXIETY: ICD-10-CM

## 2020-12-30 RX ORDER — LAMOTRIGINE 25 MG/1
TABLET ORAL
Qty: 60 TABLET | Refills: 1 | OUTPATIENT
Start: 2020-12-30

## 2020-12-30 RX ORDER — CLONAZEPAM 0.5 MG/1
TABLET ORAL
Qty: 60 TABLET | Refills: 1 | Status: SHIPPED | OUTPATIENT
Start: 2020-12-30 | End: 2021-04-26

## 2021-03-05 ENCOUNTER — LAB (OUTPATIENT)
Dept: LAB | Facility: HOSPITAL | Age: 36
End: 2021-03-05

## 2021-03-05 ENCOUNTER — TRANSCRIBE ORDERS (OUTPATIENT)
Dept: ADMINISTRATIVE | Facility: HOSPITAL | Age: 36
End: 2021-03-05

## 2021-03-05 DIAGNOSIS — Z01.818 PRE-OP TESTING: Primary | ICD-10-CM

## 2021-03-05 DIAGNOSIS — Z01.818 PRE-OP TESTING: ICD-10-CM

## 2021-03-05 LAB
ABO GROUP BLD: NORMAL
BASOPHILS # BLD AUTO: 0.05 10*3/MM3 (ref 0–0.2)
BASOPHILS NFR BLD AUTO: 0.7 % (ref 0–1.5)
BLD GP AB SCN SERPL QL: NEGATIVE
DEPRECATED RDW RBC AUTO: 39.2 FL (ref 37–54)
EOSINOPHIL # BLD AUTO: 0.11 10*3/MM3 (ref 0–0.4)
EOSINOPHIL NFR BLD AUTO: 1.6 % (ref 0.3–6.2)
ERYTHROCYTE [DISTWIDTH] IN BLOOD BY AUTOMATED COUNT: 12.2 % (ref 12.3–15.4)
HCG INTACT+B SERPL-ACNC: <0.5 MIU/ML
HCT VFR BLD AUTO: 41.7 % (ref 34–46.6)
HGB BLD-MCNC: 14.1 G/DL (ref 12–15.9)
IMM GRANULOCYTES # BLD AUTO: 0.02 10*3/MM3 (ref 0–0.05)
IMM GRANULOCYTES NFR BLD AUTO: 0.3 % (ref 0–0.5)
LYMPHOCYTES # BLD AUTO: 2.14 10*3/MM3 (ref 0.7–3.1)
LYMPHOCYTES NFR BLD AUTO: 31.8 % (ref 19.6–45.3)
MCH RBC QN AUTO: 30.2 PG (ref 26.6–33)
MCHC RBC AUTO-ENTMCNC: 33.8 G/DL (ref 31.5–35.7)
MCV RBC AUTO: 89.3 FL (ref 79–97)
MONOCYTES # BLD AUTO: 0.43 10*3/MM3 (ref 0.1–0.9)
MONOCYTES NFR BLD AUTO: 6.4 % (ref 5–12)
NEUTROPHILS NFR BLD AUTO: 3.97 10*3/MM3 (ref 1.7–7)
NEUTROPHILS NFR BLD AUTO: 59.2 % (ref 42.7–76)
NRBC BLD AUTO-RTO: 0 /100 WBC (ref 0–0.2)
PLATELET # BLD AUTO: 301 10*3/MM3 (ref 140–450)
PMV BLD AUTO: 10.3 FL (ref 6–12)
RBC # BLD AUTO: 4.67 10*6/MM3 (ref 3.77–5.28)
RH BLD: POSITIVE
T&S EXPIRATION DATE: NORMAL
WBC # BLD AUTO: 6.72 10*3/MM3 (ref 3.4–10.8)

## 2021-03-05 PROCEDURE — 86850 RBC ANTIBODY SCREEN: CPT

## 2021-03-05 PROCEDURE — 86901 BLOOD TYPING SEROLOGIC RH(D): CPT

## 2021-03-05 PROCEDURE — 36415 COLL VENOUS BLD VENIPUNCTURE: CPT

## 2021-03-05 PROCEDURE — 86900 BLOOD TYPING SEROLOGIC ABO: CPT

## 2021-03-05 PROCEDURE — 84702 CHORIONIC GONADOTROPIN TEST: CPT

## 2021-03-05 PROCEDURE — 85025 COMPLETE CBC W/AUTO DIFF WBC: CPT

## 2021-03-11 ENCOUNTER — LAB REQUISITION (OUTPATIENT)
Dept: LAB | Facility: HOSPITAL | Age: 36
End: 2021-03-11

## 2021-03-11 DIAGNOSIS — N87.9 DYSPLASIA OF CERVIX UTERI, UNSPECIFIED: ICD-10-CM

## 2021-03-11 PROCEDURE — 88309 TISSUE EXAM BY PATHOLOGIST: CPT | Performed by: OBSTETRICS & GYNECOLOGY

## 2021-03-12 ENCOUNTER — LAB REQUISITION (OUTPATIENT)
Dept: LAB | Facility: HOSPITAL | Age: 36
End: 2021-03-12

## 2021-03-12 DIAGNOSIS — K21.9 GASTRO-ESOPHAGEAL REFLUX DISEASE WITHOUT ESOPHAGITIS: ICD-10-CM

## 2021-03-12 DIAGNOSIS — R73.9 HYPERGLYCEMIA, UNSPECIFIED: ICD-10-CM

## 2021-03-12 DIAGNOSIS — K58.9 IRRITABLE BOWEL SYNDROME WITHOUT DIARRHEA: ICD-10-CM

## 2021-03-12 DIAGNOSIS — E66.01 MORBID (SEVERE) OBESITY DUE TO EXCESS CALORIES (HCC): ICD-10-CM

## 2021-03-12 DIAGNOSIS — K44.9 DIAPHRAGMATIC HERNIA WITHOUT OBSTRUCTION OR GANGRENE: ICD-10-CM

## 2021-03-12 DIAGNOSIS — I10 ESSENTIAL (PRIMARY) HYPERTENSION: ICD-10-CM

## 2021-03-12 LAB
BASOPHILS # BLD AUTO: 0 10*3/MM3 (ref 0–0.2)
BASOPHILS NFR BLD AUTO: 0.2 % (ref 0–1.5)
DEPRECATED RDW RBC AUTO: 40.3 FL (ref 37–54)
EOSINOPHIL # BLD AUTO: 0 10*3/MM3 (ref 0–0.4)
EOSINOPHIL NFR BLD AUTO: 0 % (ref 0.3–6.2)
ERYTHROCYTE [DISTWIDTH] IN BLOOD BY AUTOMATED COUNT: 13.1 % (ref 12.3–15.4)
HCT VFR BLD AUTO: 33.5 % (ref 34–46.6)
HGB BLD-MCNC: 12 G/DL (ref 12–15.9)
LYMPHOCYTES # BLD AUTO: 2.3 10*3/MM3 (ref 0.7–3.1)
LYMPHOCYTES NFR BLD AUTO: 20.4 % (ref 19.6–45.3)
MCH RBC QN AUTO: 30.8 PG (ref 26.6–33)
MCHC RBC AUTO-ENTMCNC: 35.7 G/DL (ref 31.5–35.7)
MCV RBC AUTO: 86.3 FL (ref 79–97)
MONOCYTES # BLD AUTO: 0.5 10*3/MM3 (ref 0.1–0.9)
MONOCYTES NFR BLD AUTO: 4.8 % (ref 5–12)
NEUTROPHILS NFR BLD AUTO: 74.6 % (ref 42.7–76)
NEUTROPHILS NFR BLD AUTO: 8.2 10*3/MM3 (ref 1.7–7)
NRBC BLD AUTO-RTO: 0 /100 WBC (ref 0–0.2)
PLATELET # BLD AUTO: 206 10*3/MM3 (ref 140–450)
PMV BLD AUTO: 8.6 FL (ref 6–12)
RBC # BLD AUTO: 3.89 10*6/MM3 (ref 3.77–5.28)
WBC # BLD AUTO: 11 10*3/MM3 (ref 3.4–10.8)

## 2021-03-12 PROCEDURE — 85025 COMPLETE CBC W/AUTO DIFF WBC: CPT | Performed by: OBSTETRICS & GYNECOLOGY

## 2021-03-15 LAB
LAB AP CASE REPORT: NORMAL
LAB AP DIAGNOSIS COMMENT: NORMAL
PATH REPORT.FINAL DX SPEC: NORMAL
PATH REPORT.GROSS SPEC: NORMAL

## 2021-04-21 ENCOUNTER — OFFICE VISIT (OUTPATIENT)
Dept: PSYCHIATRY | Facility: CLINIC | Age: 36
End: 2021-04-21

## 2021-04-21 DIAGNOSIS — F33.0 MILD RECURRENT MAJOR DEPRESSION (HCC): ICD-10-CM

## 2021-04-21 DIAGNOSIS — R41.840 ATTENTION DEFICIT: ICD-10-CM

## 2021-04-21 DIAGNOSIS — F32.81 PMDD (PREMENSTRUAL DYSPHORIC DISORDER): Primary | Chronic | ICD-10-CM

## 2021-04-21 DIAGNOSIS — F41.9 ANXIETY: ICD-10-CM

## 2021-04-21 PROCEDURE — 99213 OFFICE O/P EST LOW 20 MIN: CPT | Performed by: PHYSICIAN ASSISTANT

## 2021-04-21 RX ORDER — BUPROPION HYDROCHLORIDE 150 MG/1
150 TABLET ORAL EVERY MORNING
Qty: 90 TABLET | Refills: 1 | Status: SHIPPED | OUTPATIENT
Start: 2021-04-21 | End: 2021-09-28

## 2021-04-21 NOTE — PROGRESS NOTES
Subjective   Subjective   Marjorie Montague is a 35 y.o.white female who presents today for follow up in the office    Chief Complaint:  Anxiety, depression, PMDD    History of Present Illness:   Works at [employer], now intermittently on COVID unit  Had a partial hysterectomy on March 11, so has been on medical leave,  high grade cervical dysplasia, has a follow up with Dr. Garrett coming soon  Loves being in her dream home, still doing remodels  Motivation better with Wellbutrin  Mood swings around menses are improved with lamictal, some days only takes AM dose  Depression 1/10, has days when she thinks she is not good enough, fluctuates  Anxiety 5/10, most days only takes one klonopin  Denies SI/HI  Wednesday women's group through [facility] has helped  Great , no financial issues      PAST PSYCHIATRIC HISTORY  Axis I  Affective/Bipoloar Disorder, Anxiety/Panic Disorder  Axis II  None    PAST OUTPATIENT TREATMENT  Diagnosis treated:  Affective Disorder, Anxiety/Panic Disorder  Treatment Type:  Medication Management  Prior Psychiatric Medications:  Paxil, uncontrollable yawining  Prozac, uncontrollable yawining  Sarafem  Provigil, caused a rash  Trintelllix caused headaches   Lamictal  Klonopin  Wellbutrin  Support Groups:  Women's group  Sequelae Of Mental Disorder:  job disruption, social isolation, emotional distress      Interval History  No Change    Side Effects  None    Past psychiatric history was reviewed and compared to 11/30/20 visit and appropriate updates were made.    Past Medical History:  Past Medical History:   Diagnosis Date   • Anxiety    • PMDD (premenstrual dysphoric disorder)        Social History:  Social History     Socioeconomic History   • Marital status:      Spouse name: Not on file   • Number of children: Not on file   • Years of education: Not on file   • Highest education level: Not on file   Tobacco Use   • Smoking status: Never Smoker   • Smokeless tobacco: Never Used    Substance and Sexual Activity   • Alcohol use: Yes     Alcohol/week: 2.0 standard drinks     Types: 2 Glasses of wine per week     Comment: social   • Drug use: No   • Sexual activity: Yes     Partners: Male     Birth control/protection: Surgical       Family History:  Family History   Problem Relation Age of Onset   • Depression Father    • Post-traumatic stress disorder Father    • Anxiety disorder Father        Past Surgical History:  No past surgical history on file.    Problem List:  Patient Active Problem List   Diagnosis   • Vitamin D deficiency   • PMDD (premenstrual dysphoric disorder)   • Anxiety       Allergy:   Allergies   Allergen Reactions   • Penicillin G Rash        Discontinued Medications:  Medications Discontinued During This Encounter   Medication Reason   • promethazine-dextromethorphan (PROMETHAZINE-DM) 6.25-15 MG/5ML syrup *Therapy completed   • buPROPion XL (Wellbutrin XL) 150 MG 24 hr tablet Reorder       Current Medications:   Current Outpatient Medications   Medication Sig Dispense Refill   • azithromycin (Zithromax) 250 MG tablet Take 2 tablets the first day, then 1 tablet daily for 4 days. 6 tablet 0   • buPROPion XL (Wellbutrin XL) 150 MG 24 hr tablet Take 1 tablet by mouth Every Morning. 90 tablet 1   • clonazePAM (KlonoPIN) 0.5 MG tablet TAKE ONE TABLET BY MOUTH TWICE A DAY AS NEEDED FOR SEIZURES 60 tablet 1   • lamoTRIgine (LaMICtal) 100 MG tablet Take 0.5 tablets by mouth 2 (Two) Times a Day. 180 tablet 1     No current facility-administered medications for this visit.         Review of Symptoms:    Psychiatric/Behavioral: Negative for agitation, behavioral problems, confusion, hallucinations, self-injury, sleep disturbance and suicidal ideas. The patient is mildly nervous/anxious with decreased concentration and dysphoric mood but is not hyperactive.        Physical Exam:   not currently breastfeeding.    Mental Status Exam:   Hygiene:  Good  Cooperation:  Cooperative  Eye  Contact: Good  Psychomotor Behavior:  Appropriate  Affect:  Appropriate  Mood: Normal  Hopelessness: Denies  Speech:  Normal  Thought Process:  Goal directed  Thought Content:  Normal  Suicidal:  None  Homicidal:  None  Hallucinations:  None  Delusion:  None  Memory:  Intact  Orientation:  Person, Place, Time and Situation  Reliability:  good  Insight:  Good  Judgement:  Good  Impulse Control:  Good  Physical/Medical Issues:  No      Mental status exam was reviewed and compared to 1/30/20 visit and appropriate updates were made.    PHQ-9 Depression Screening  Little interest or pleasure in doing things? 0   Feeling down, depressed, or hopeless? 1   Trouble falling or staying asleep, or sleeping too much?     Feeling tired or having little energy?     Poor appetite or overeating?     Feeling bad about yourself - or that you are a failure or have let yourself or your family down?     Trouble concentrating on things, such as reading the newspaper or watching television?     Moving or speaking so slowly that other people could have noticed? Or the opposite - being so fidgety or restless that you have been moving around a lot more than usual?     Thoughts that you would be better off dead, or of hurting yourself in some way?     PHQ-9 Total Score 1   If you checked off any problems, how difficult have these problems made it for you to do your work, take care of things at home, or get along with other people?             Never smoker    I advised Marjorie of the risks of tobacco use.     Lab Results:   Lab Requisition on 03/12/2021   Component Date Value Ref Range Status   • WBC 03/12/2021 11.00* 3.40 - 10.80 10*3/mm3 Final   • RBC 03/12/2021 3.89  3.77 - 5.28 10*6/mm3 Final   • Hemoglobin 03/12/2021 12.0  12.0 - 15.9 g/dL Final   • Hematocrit 03/12/2021 33.5* 34.0 - 46.6 % Final   • MCV 03/12/2021 86.3  79.0 - 97.0 fL Final   • MCH 03/12/2021 30.8  26.6 - 33.0 pg Final   • MCHC 03/12/2021 35.7  31.5 - 35.7 g/dL Final   •  RDW 03/12/2021 13.1  12.3 - 15.4 % Final   • RDW-SD 03/12/2021 40.3  37.0 - 54.0 fl Final   • MPV 03/12/2021 8.6  6.0 - 12.0 fL Final   • Platelets 03/12/2021 206  140 - 450 10*3/mm3 Final   • Neutrophil % 03/12/2021 74.6  42.7 - 76.0 % Final   • Lymphocyte % 03/12/2021 20.4  19.6 - 45.3 % Final   • Monocyte % 03/12/2021 4.8* 5.0 - 12.0 % Final   • Eosinophil % 03/12/2021 0.0* 0.3 - 6.2 % Final   • Basophil % 03/12/2021 0.2  0.0 - 1.5 % Final   • Neutrophils, Absolute 03/12/2021 8.20* 1.70 - 7.00 10*3/mm3 Final   • Lymphocytes, Absolute 03/12/2021 2.30  0.70 - 3.10 10*3/mm3 Final   • Monocytes, Absolute 03/12/2021 0.50  0.10 - 0.90 10*3/mm3 Final   • Eosinophils, Absolute 03/12/2021 0.00  0.00 - 0.40 10*3/mm3 Final   • Basophils, Absolute 03/12/2021 0.00  0.00 - 0.20 10*3/mm3 Final   • nRBC 03/12/2021 0.0  0.0 - 0.2 /100 WBC Final   Lab Requisition on 03/11/2021   Component Date Value Ref Range Status   • Case Report 03/11/2021    Final                    Value:Surgical Pathology Report                         Case: [id number]                                  Authorizing Provider:  Shivam Hicks MD   Collected:           03/11/2021 09:30 AM          Ordering Location:     [facility]       Received:            03/11/2021 02:43 PM                                 LABORATORY                                                                   Pathologist:           Prasanna Hernández MD                                                             Specimen:    Uterus with Cervix, Bilateral Tubes and Ovaries                                           • Final Diagnosis 03/11/2021    Final                    Value:This result contains rich text formatting which cannot be displayed here.   • Comment 03/11/2021    Final                    Value:This result contains rich text formatting which cannot be displayed here.   • Gross Description 03/11/2021    Final                    Value:This result contains rich text  formatting which cannot be displayed here.   Lab on 03/05/2021   Component Date Value Ref Range Status   • ABO Type 03/05/2021 O   Final   • RH type 03/05/2021 Positive   Final   • Antibody Screen 03/05/2021 Negative   Final   • T&S Expiration Date 03/05/2021 3/13/2021 11:59:00 PM   Final   • HCG Quantitative 03/05/2021 <0.50  mIU/mL Final   • WBC 03/05/2021 6.72  3.40 - 10.80 10*3/mm3 Final   • RBC 03/05/2021 4.67  3.77 - 5.28 10*6/mm3 Final   • Hemoglobin 03/05/2021 14.1  12.0 - 15.9 g/dL Final   • Hematocrit 03/05/2021 41.7  34.0 - 46.6 % Final   • MCV 03/05/2021 89.3  79.0 - 97.0 fL Final   • MCH 03/05/2021 30.2  26.6 - 33.0 pg Final   • MCHC 03/05/2021 33.8  31.5 - 35.7 g/dL Final   • RDW 03/05/2021 12.2* 12.3 - 15.4 % Final   • RDW-SD 03/05/2021 39.2  37.0 - 54.0 fl Final   • MPV 03/05/2021 10.3  6.0 - 12.0 fL Final   • Platelets 03/05/2021 301  140 - 450 10*3/mm3 Final   • Neutrophil % 03/05/2021 59.2  42.7 - 76.0 % Final   • Lymphocyte % 03/05/2021 31.8  19.6 - 45.3 % Final   • Monocyte % 03/05/2021 6.4  5.0 - 12.0 % Final   • Eosinophil % 03/05/2021 1.6  0.3 - 6.2 % Final   • Basophil % 03/05/2021 0.7  0.0 - 1.5 % Final   • Immature Grans % 03/05/2021 0.3  0.0 - 0.5 % Final   • Neutrophils, Absolute 03/05/2021 3.97  1.70 - 7.00 10*3/mm3 Final   • Lymphocytes, Absolute 03/05/2021 2.14  0.70 - 3.10 10*3/mm3 Final   • Monocytes, Absolute 03/05/2021 0.43  0.10 - 0.90 10*3/mm3 Final   • Eosinophils, Absolute 03/05/2021 0.11  0.00 - 0.40 10*3/mm3 Final   • Basophils, Absolute 03/05/2021 0.05  0.00 - 0.20 10*3/mm3 Final   • Immature Grans, Absolute 03/05/2021 0.02  0.00 - 0.05 10*3/mm3 Final   • nRBC 03/05/2021 0.0  0.0 - 0.2 /100 WBC Final       Assessment/Plan   Problems Addressed this Visit        Genitourinary and Reproductive     PMDD (premenstrual dysphoric disorder) - Primary (Chronic)    Relevant Medications    buPROPion XL (Wellbutrin XL) 150 MG 24 hr tablet       Mental Health    Anxiety      Other Visit  Diagnoses     Attention deficit        Mild recurrent major depression (CMS/HCC)        Relevant Medications    buPROPion XL (Wellbutrin XL) 150 MG 24 hr tablet      Diagnoses       Codes Comments    PMDD (premenstrual dysphoric disorder)    -  Primary ICD-10-CM: F32.81  ICD-9-CM: 625.4     Attention deficit     ICD-10-CM: R41.840  ICD-9-CM: 799.51     Anxiety     ICD-10-CM: F41.9  ICD-9-CM: 300.00     Mild recurrent major depression (CMS/HCC)     ICD-10-CM: F33.0  ICD-9-CM: 296.31           Visit Diagnoses:    ICD-10-CM ICD-9-CM   1. PMDD (premenstrual dysphoric disorder)  F32.81 625.4   2. Attention deficit  R41.840 799.51   3. Anxiety  F41.9 300.00   4. Mild recurrent major depression (CMS/HCC)  F33.0 296.31       TREATMENT PLAN/GOALS: Continue supportive psychotherapy efforts and medications as indicated. Treatment and medication options discussed during today's visit. Patient ackowledged and verbally consented to continue with current treatment plan and was educated on the importance of compliance with treatment and follow-up appointments.    MEDICATION ISSUES:  INSPECT reviewed as expected  Discussed medication options and treatment plan of prescribed medication as well as the risks, benefits, and side effects including potential falls, possible impaired driving and metabolic adversities among others. Patient is agreeable to call the office with any worsening of symptoms or onset of side effects. Patient is agreeable to call 911 or go to the nearest ER should he/she begin having SI/HI. No medication side effects or related complaints today.     Patient has significant PMDD symptoms 7 days prior to her onset of menses.  She has had the same significant side effect with every SSRI, which is excessive yawning.  Patient is doingwell on her current meds  Continue Wellbutrin XL 150mg QAM for mood and attention deficit  Continue Lamictal 100mg BID for mood stabilizer  Continue Klonopin 0.5mg BID prn anxiety  Now that  she has met her deductible, she is considering neuropsych testing to evaluate for ADHD.  Her insurance company may require a referral, and, if so, can send her to [facility]    MEDS ORDERED DURING VISIT:  New Medications Ordered This Visit   Medications   • buPROPion XL (Wellbutrin XL) 150 MG 24 hr tablet     Sig: Take 1 tablet by mouth Every Morning.     Dispense:  90 tablet     Refill:  1       Return in about 6 months (around 10/21/2021).    This document has been electronically signed by Elizabeth Damon PA-C  April 21, 2021 14:57 EDT

## 2021-04-24 DIAGNOSIS — F32.81 PMDD (PREMENSTRUAL DYSPHORIC DISORDER): ICD-10-CM

## 2021-04-24 DIAGNOSIS — F41.9 ANXIETY: ICD-10-CM

## 2021-04-26 RX ORDER — CLONAZEPAM 0.5 MG/1
TABLET ORAL
Qty: 60 TABLET | Refills: 2 | Status: SHIPPED | OUTPATIENT
Start: 2021-04-26 | End: 2021-09-28

## 2021-09-27 DIAGNOSIS — F41.9 ANXIETY: ICD-10-CM

## 2021-09-27 DIAGNOSIS — F32.81 PMDD (PREMENSTRUAL DYSPHORIC DISORDER): ICD-10-CM

## 2021-09-28 RX ORDER — CLONAZEPAM 0.5 MG/1
0.5 TABLET ORAL 2 TIMES DAILY PRN
Qty: 60 TABLET | Refills: 2 | Status: SHIPPED | OUTPATIENT
Start: 2021-09-28 | End: 2022-05-17 | Stop reason: SDUPTHER

## 2021-09-28 RX ORDER — BUPROPION HYDROCHLORIDE 150 MG/1
TABLET ORAL
Qty: 90 TABLET | Refills: 1 | Status: SHIPPED | OUTPATIENT
Start: 2021-09-28 | End: 2022-03-20

## 2021-11-17 ENCOUNTER — OFFICE VISIT (OUTPATIENT)
Dept: PSYCHIATRY | Facility: CLINIC | Age: 36
End: 2021-11-17

## 2021-11-17 DIAGNOSIS — R41.840 ATTENTION DEFICIT: ICD-10-CM

## 2021-11-17 DIAGNOSIS — F33.0 MILD RECURRENT MAJOR DEPRESSION (HCC): Chronic | ICD-10-CM

## 2021-11-17 DIAGNOSIS — F32.81 PMDD (PREMENSTRUAL DYSPHORIC DISORDER): Primary | Chronic | ICD-10-CM

## 2021-11-17 PROCEDURE — 99214 OFFICE O/P EST MOD 30 MIN: CPT | Performed by: PHYSICIAN ASSISTANT

## 2021-11-17 NOTE — PROGRESS NOTES
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  Klonopin has not been helping so started cutting it back and trying to wean off   She has been working out at home with her   Downloaded a meditation and mindfulness thomas  Loves being in her dream home, still doing remodels  Still wants to take the lamictal b/c it helps her PMS dysphoria  Motivation better with Wellbutrin  Mood swings around menses are improved with lamictal, some days only takes AM dose  Depression, denies   Anxiety  When anxiety gets bad, she gets vertigo  Denies SI/HI  Wednesday women's group through [employer] has helped, one of her friends goes to holistic doctor  Great , no financial issues      PAST PSYCHIATRIC HISTORY  Axis I  Affective/Bipoloar Disorder, Anxiety/Panic Disorder  Axis II  None    PAST OUTPATIENT TREATMENT  Diagnosis treated:  Affective Disorder, Anxiety/Panic Disorder  Treatment Type:  Medication Management  Prior Psychiatric Medications:  Paxil, uncontrollable yawining  Prozac, uncontrollable yawining  Sarafem  Provigil, caused a rash  Trintelllix caused headaches   Lamictal  Klonopin  Wellbutrin  Support Groups:  Women's group  Sequelae Of Mental Disorder:  job disruption, social isolation, emotional distress      Interval History  No Change    Side Effects  None    Past psychiatric history was reviewed and compared to 4/21/21 visit and appropriate updates were made.    Past Medical History:  Past Medical History:   Diagnosis Date   • Anxiety    • PMDD (premenstrual dysphoric disorder)        Social History:  Social History     Socioeconomic History   • Marital status:    Tobacco Use   • Smoking status: Never Smoker   • Smokeless  tobacco: Never Used   Substance and Sexual Activity   • Alcohol use: Yes     Alcohol/week: 2.0 standard drinks     Types: 2 Glasses of wine per week     Comment: social   • Drug use: No   • Sexual activity: Yes     Partners: Male     Birth control/protection: Surgical       Family History:  Family History   Problem Relation Age of Onset   • Depression Father    • Post-traumatic stress disorder Father    • Anxiety disorder Father        Past Surgical History:  History reviewed. No pertinent surgical history.    Problem List:  Patient Active Problem List   Diagnosis   • Vitamin D deficiency   • PMDD (premenstrual dysphoric disorder)   • Anxiety       Allergy:   Allergies   Allergen Reactions   • Penicillin G Rash        Discontinued Medications:  There are no discontinued medications.    Current Medications:   Current Outpatient Medications   Medication Sig Dispense Refill   • azithromycin (Zithromax) 250 MG tablet Take 2 tablets the first day, then 1 tablet daily for 4 days. 6 tablet 0   • buPROPion XL (WELLBUTRIN XL) 150 MG 24 hr tablet TAKE ONE TABLET BY MOUTH EVERY MORNING 90 tablet 1   • clonazePAM (KlonoPIN) 0.5 MG tablet Take 1 tablet by mouth 2 (Two) Times a Day As Needed for Anxiety. 60 tablet 2   • lamoTRIgine (LaMICtal) 100 MG tablet Take 0.5 tablets by mouth 2 (Two) Times a Day. 180 tablet 1     No current facility-administered medications for this visit.         Review of Symptoms:    Psychiatric/Behavioral: Negative for agitation, behavioral problems, confusion, hallucinations, self-injury, sleep disturbance and suicidal ideas. The patient is mildly nervous/anxious with decreased concentration and dysphoric mood but is not hyperactive.        Physical Exam:   not currently breastfeeding.    Mental Status Exam:   Hygiene:  Good  Cooperation:  Cooperative  Eye Contact: Good  Psychomotor Behavior:  Appropriate  Affect:  Appropriate  Mood: Normal  Hopelessness: Denies  Speech:  Normal  Thought Process:  Goal  directed  Thought Content:  Normal  Suicidal:  None  Homicidal:  None  Hallucinations:  None  Delusion:  None  Memory:  Intact  Orientation:  Person, Place, Time and Situation  Reliability:  good  Insight:  Good  Judgement:  Good  Impulse Control:  Good  Physical/Medical Issues:  No      Mental status exam was reviewed and compared to 4/21/21 visit and no updates were needed.      PHQ-9 Depression Screening  Little interest or pleasure in doing things? 0   Feeling down, depressed, or hopeless? 0   Trouble falling or staying asleep, or sleeping too much?     Feeling tired or having little energy?     Poor appetite or overeating?     Feeling bad about yourself - or that you are a failure or have let yourself or your family down?     Trouble concentrating on things, such as reading the newspaper or watching television?     Moving or speaking so slowly that other people could have noticed? Or the opposite - being so fidgety or restless that you have been moving around a lot more than usual?     Thoughts that you would be better off dead, or of hurting yourself in some way?     PHQ-9 Total Score 0   If you checked off any problems, how difficult have these problems made it for you to do your work, take care of things at home, or get along with other people?             Never smoker    I advised Marjorie of the risks of tobacco use.     Lab Results:   No visits with results within 3 Month(s) from this visit.   Latest known visit with results is:   Lab Requisition on 03/12/2021   Component Date Value Ref Range Status   • WBC 03/12/2021 11.00* 3.40 - 10.80 10*3/mm3 Final   • RBC 03/12/2021 3.89  3.77 - 5.28 10*6/mm3 Final   • Hemoglobin 03/12/2021 12.0  12.0 - 15.9 g/dL Final   • Hematocrit 03/12/2021 33.5* 34.0 - 46.6 % Final   • MCV 03/12/2021 86.3  79.0 - 97.0 fL Final   • MCH 03/12/2021 30.8  26.6 - 33.0 pg Final   • MCHC 03/12/2021 35.7  31.5 - 35.7 g/dL Final   • RDW 03/12/2021 13.1  12.3 - 15.4 % Final   • RDW-SD  03/12/2021 40.3  37.0 - 54.0 fl Final   • MPV 03/12/2021 8.6  6.0 - 12.0 fL Final   • Platelets 03/12/2021 206  140 - 450 10*3/mm3 Final   • Neutrophil % 03/12/2021 74.6  42.7 - 76.0 % Final   • Lymphocyte % 03/12/2021 20.4  19.6 - 45.3 % Final   • Monocyte % 03/12/2021 4.8* 5.0 - 12.0 % Final   • Eosinophil % 03/12/2021 0.0* 0.3 - 6.2 % Final   • Basophil % 03/12/2021 0.2  0.0 - 1.5 % Final   • Neutrophils, Absolute 03/12/2021 8.20* 1.70 - 7.00 10*3/mm3 Final   • Lymphocytes, Absolute 03/12/2021 2.30  0.70 - 3.10 10*3/mm3 Final   • Monocytes, Absolute 03/12/2021 0.50  0.10 - 0.90 10*3/mm3 Final   • Eosinophils, Absolute 03/12/2021 0.00  0.00 - 0.40 10*3/mm3 Final   • Basophils, Absolute 03/12/2021 0.00  0.00 - 0.20 10*3/mm3 Final   • nRBC 03/12/2021 0.0  0.0 - 0.2 /100 WBC Final       Assessment/Plan   Problems Addressed this Visit        Genitourinary and Reproductive     PMDD (premenstrual dysphoric disorder) - Primary (Chronic)      Other Visit Diagnoses     Mild recurrent major depression (HCC)  (Chronic)       Attention deficit          Diagnoses       Codes Comments    PMDD (premenstrual dysphoric disorder)    -  Primary ICD-10-CM: F32.81  ICD-9-CM: 625.4     Mild recurrent major depression (HCC)     ICD-10-CM: F33.0  ICD-9-CM: 296.31     Attention deficit     ICD-10-CM: R41.840  ICD-9-CM: 799.51           Visit Diagnoses:    ICD-10-CM ICD-9-CM   1. PMDD (premenstrual dysphoric disorder)  F32.81 625.4   2. Mild recurrent major depression (HCC)  F33.0 296.31   3. Attention deficit  R41.840 799.51       TREATMENT PLAN/GOALS: Continue supportive psychotherapy efforts and medications as indicated. Treatment and medication options discussed during today's visit. Patient ackowledged and verbally consented to continue with current treatment plan and was educated on the importance of compliance with treatment and follow-up appointments.    MEDICATION ISSUES:  INSPECT reviewed as expected  Discussed medication options  and treatment plan of prescribed medication as well as the risks, benefits, and side effects including potential falls, possible impaired driving and metabolic adversities among others. Patient is agreeable to call the office with any worsening of symptoms or onset of side effects. Patient is agreeable to call 911 or go to the nearest ER should he/she begin having SI/HI. No medication side effects or related complaints today.     Patient has significant PMDD symptoms 7 days prior to her onset of menses.  She has had the same significant side effect with every SSRI, which is excessive yawning.  Patient is doingwell on her current meds  Continue Wellbutrin XL 150mg QAM for mood and attention deficit  Continue Lamictal 100mg BID for mood stabilizer  She is weaning off Klonopin 0.5mg BID prn anxiety  Now that she has met her deductible, she is considering neuropsych testing to evaluate for ADHD.  Her insurance company may require a referral, and, if so, can send her to [facility]    MEDS ORDERED DURING VISIT:  No orders of the defined types were placed in this encounter.      Return in about 6 months (around 5/17/2022).    This document has been electronically signed by Elizabeth Damon PA-C  November 17, 2021 10:21 EST

## 2022-02-11 DIAGNOSIS — F32.81 PMDD (PREMENSTRUAL DYSPHORIC DISORDER): ICD-10-CM

## 2022-02-14 RX ORDER — LAMOTRIGINE 100 MG/1
TABLET ORAL
Qty: 90 TABLET | Refills: 1 | Status: SHIPPED | OUTPATIENT
Start: 2022-02-14 | End: 2022-05-17

## 2022-03-18 DIAGNOSIS — F32.81 PMDD (PREMENSTRUAL DYSPHORIC DISORDER): ICD-10-CM

## 2022-03-20 RX ORDER — BUPROPION HYDROCHLORIDE 150 MG/1
TABLET ORAL
Qty: 90 TABLET | Refills: 1 | Status: SHIPPED | OUTPATIENT
Start: 2022-03-20 | End: 2022-05-17 | Stop reason: ALTCHOICE

## 2022-05-17 ENCOUNTER — OFFICE VISIT (OUTPATIENT)
Dept: PSYCHIATRY | Facility: CLINIC | Age: 37
End: 2022-05-17

## 2022-05-17 DIAGNOSIS — F32.81 PMDD (PREMENSTRUAL DYSPHORIC DISORDER): ICD-10-CM

## 2022-05-17 DIAGNOSIS — F41.9 ANXIETY: ICD-10-CM

## 2022-05-17 PROCEDURE — 99213 OFFICE O/P EST LOW 20 MIN: CPT | Performed by: PHYSICIAN ASSISTANT

## 2022-05-17 RX ORDER — LAMOTRIGINE 25 MG/1
25 TABLET ORAL 2 TIMES DAILY
Qty: 180 TABLET | Refills: 1 | Status: SHIPPED | OUTPATIENT
Start: 2022-05-17

## 2022-05-17 RX ORDER — BUPROPION HYDROCHLORIDE 100 MG/1
100 TABLET, EXTENDED RELEASE ORAL 2 TIMES DAILY
Qty: 180 TABLET | Refills: 1 | Status: SHIPPED | OUTPATIENT
Start: 2022-05-17

## 2022-05-17 RX ORDER — CLONAZEPAM 0.5 MG/1
0.5 TABLET ORAL 2 TIMES DAILY PRN
Qty: 60 TABLET | Refills: 0 | Status: SHIPPED | OUTPATIENT
Start: 2022-05-17

## 2022-05-17 NOTE — PROGRESS NOTES
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  Lamictal helps stabilize mood but has been trying to back off of it, adjusts her dose, will take the Lamictal twice a day during mensies  Downloaded a meditation and mindfulness thomas  Loves being in her dream home, still doing remodels  Still wants to take the lamictal b/c it helps her PMS dysphoria  Motivation better with Wellbutrin  Mood swings around menses are improved with lamictal, some days only takes AM dose  Depression, denies   Anxiety 5/10, mostly work related  When anxiety gets bad, she gets vertigo  Denies SI/HI  Wednesday women's group through [employer] has helped, one of her friends goes to holistic doctor  Great , no financial issues      PAST PSYCHIATRIC HISTORY  Axis I  Affective/Bipoloar Disorder, Anxiety/Panic Disorder  Axis II  None    PAST OUTPATIENT TREATMENT  Diagnosis treated:  Affective Disorder, Anxiety/Panic Disorder  Treatment Type:  Medication Management  Prior Psychiatric Medications:  Paxil, uncontrollable yawining  Prozac, uncontrollable yawining  Sarafem  Provigil, caused a rash  Trintelllix caused headaches   Lamictal  Klonopin  Wellbutrin  Support Groups:  Women's group  Sequelae Of Mental Disorder:  job disruption, social isolation, emotional distress      Interval History  No Change    Side Effects  None    Past psychiatric history was reviewed and compared to 11/17/21 visit and appropriate updates  were made.    Past Medical History:  Past Medical History:   Diagnosis Date   • Anxiety    • PMDD (premenstrual dysphoric disorder)        Social History:  Social History     Socioeconomic History   • Marital status:    Tobacco Use   • Smoking status: Never Smoker   • Smokeless tobacco: Never Used   Substance and Sexual Activity   • Alcohol use: Yes     Alcohol/week: 2.0 standard drinks     Types: 2 Glasses of wine per week     Comment: social   • Drug use: No   • Sexual activity: Yes     Partners: Male     Birth control/protection: Surgical       Family History:  Family History   Problem Relation Age of Onset   • Depression Father    • Post-traumatic stress disorder Father    • Anxiety disorder Father        Past Surgical History:  History reviewed. No pertinent surgical history.    Problem List:  Patient Active Problem List   Diagnosis   • Vitamin D deficiency   • PMDD (premenstrual dysphoric disorder)   • Anxiety       Allergy:   Allergies   Allergen Reactions   • Penicillin G Rash        Discontinued Medications:  Medications Discontinued During This Encounter   Medication Reason   • buPROPion XL (WELLBUTRIN XL) 150 MG 24 hr tablet Alternate therapy   • clonazePAM (KlonoPIN) 0.5 MG tablet Reorder   • lamoTRIgine (LaMICtal) 100 MG tablet        Current Medications:   Current Outpatient Medications   Medication Sig Dispense Refill   • clonazePAM (KlonoPIN) 0.5 MG tablet Take 1 tablet by mouth 2 (Two) Times a Day As Needed for Anxiety. 60 tablet 0   • lamoTRIgine (LaMICtal) 25 MG tablet Take 1 tablet by mouth 2 (Two) Times a Day. 180 tablet 1   • azithromycin (Zithromax) 250 MG tablet Take 2 tablets the first day, then 1 tablet daily for 4 days. 6 tablet 0   • buPROPion SR (Wellbutrin SR) 100 MG 12 hr tablet Take 1 tablet by mouth 2 (Two) Times a Day. 180 tablet 1     No current facility-administered medications for this visit.         Review of Symptoms:    Psychiatric/Behavioral: Negative for agitation,  behavioral problems, confusion, hallucinations, self-injury, sleep disturbance and suicidal ideas. The patient is mildly nervous/anxious with decreased concentration and dysphoric mood but is not hyperactive.        Physical Exam:   not currently breastfeeding.    Mental Status Exam:   Hygiene:  Good  Cooperation:  Cooperative  Eye Contact: Good  Psychomotor Behavior:  Appropriate  Affect:  Appropriate  Mood: Normal  Hopelessness: Denies  Speech:  Normal  Thought Process:  Goal directed  Thought Content:  Normal  Suicidal:  None  Homicidal:  None  Hallucinations:  None  Delusion:  None  Memory:  Intact  Orientation:  Person, Place, Time and Situation  Reliability:  good  Insight:  Good  Judgement:  Good  Impulse Control:  Good  Physical/Medical Issues:  No      Mental status exam was reviewed and compared to 11/17/21 visit and no updates were needed.      PHQ-9 Depression Screening  Little interest or pleasure in doing things?  0   Feeling down, depressed, or hopeless?  0   Trouble falling or staying asleep, or sleeping too much?     Feeling tired or having little energy?     Poor appetite or overeating?     Feeling bad about yourself - or that you are a failure or have let yourself or your family down?     Trouble concentrating on things, such as reading the newspaper or watching television?     Moving or speaking so slowly that other people could have noticed? Or the opposite - being so fidgety or restless that you have been moving around a lot more than usual?     Thoughts that you would be better off dead, or of hurting yourself in some way?     PHQ-9 Total Score  0   If you checked off any problems, how difficult have these problems made it for you to do your work, take care of things at home, or get along with other people?             Never smoker    I advised Marjorie of the risks of tobacco use.     Lab Results:   No visits with results within 3 Month(s) from this visit.   Latest known visit with results is:    Lab Requisition on 03/12/2021   Component Date Value Ref Range Status   • WBC 03/12/2021 11.00 (A) 3.40 - 10.80 10*3/mm3 Final   • RBC 03/12/2021 3.89  3.77 - 5.28 10*6/mm3 Final   • Hemoglobin 03/12/2021 12.0  12.0 - 15.9 g/dL Final   • Hematocrit 03/12/2021 33.5 (A) 34.0 - 46.6 % Final   • MCV 03/12/2021 86.3  79.0 - 97.0 fL Final   • MCH 03/12/2021 30.8  26.6 - 33.0 pg Final   • MCHC 03/12/2021 35.7  31.5 - 35.7 g/dL Final   • RDW 03/12/2021 13.1  12.3 - 15.4 % Final   • RDW-SD 03/12/2021 40.3  37.0 - 54.0 fl Final   • MPV 03/12/2021 8.6  6.0 - 12.0 fL Final   • Platelets 03/12/2021 206  140 - 450 10*3/mm3 Final   • Neutrophil % 03/12/2021 74.6  42.7 - 76.0 % Final   • Lymphocyte % 03/12/2021 20.4  19.6 - 45.3 % Final   • Monocyte % 03/12/2021 4.8 (A) 5.0 - 12.0 % Final   • Eosinophil % 03/12/2021 0.0 (A) 0.3 - 6.2 % Final   • Basophil % 03/12/2021 0.2  0.0 - 1.5 % Final   • Neutrophils, Absolute 03/12/2021 8.20 (A) 1.70 - 7.00 10*3/mm3 Final   • Lymphocytes, Absolute 03/12/2021 2.30  0.70 - 3.10 10*3/mm3 Final   • Monocytes, Absolute 03/12/2021 0.50  0.10 - 0.90 10*3/mm3 Final   • Eosinophils, Absolute 03/12/2021 0.00  0.00 - 0.40 10*3/mm3 Final   • Basophils, Absolute 03/12/2021 0.00  0.00 - 0.20 10*3/mm3 Final   • nRBC 03/12/2021 0.0  0.0 - 0.2 /100 WBC Final       Assessment & Plan   Problems Addressed this Visit        Genitourinary and Reproductive     PMDD (premenstrual dysphoric disorder) (Chronic)    Relevant Medications    lamoTRIgine (LaMICtal) 25 MG tablet    buPROPion SR (Wellbutrin SR) 100 MG 12 hr tablet    clonazePAM (KlonoPIN) 0.5 MG tablet       Mental Health    Anxiety    Relevant Medications    clonazePAM (KlonoPIN) 0.5 MG tablet      Diagnoses       Codes Comments    PMDD (premenstrual dysphoric disorder)     ICD-10-CM: F32.81  ICD-9-CM: 625.4     Anxiety     ICD-10-CM: F41.9  ICD-9-CM: 300.00           Visit Diagnoses:    ICD-10-CM ICD-9-CM   1. PMDD (premenstrual dysphoric disorder)   F32.81 625.4   2. Anxiety  F41.9 300.00       TREATMENT PLAN/GOALS: Continue supportive psychotherapy efforts and medications as indicated. Treatment and medication options discussed during today's visit. Patient ackowledged and verbally consented to continue with current treatment plan and was educated on the importance of compliance with treatment and follow-up appointments.    MEDICATION ISSUES:  INSPECT reviewed as expected  Discussed medication options and treatment plan of prescribed medication as well as the risks, benefits, and side effects including potential falls, possible impaired driving and metabolic adversities among others. Patient is agreeable to call the office with any worsening of symptoms or onset of side effects. Patient is agreeable to call 911 or go to the nearest ER should he/she begin having SI/HI. No medication side effects or related complaints today.     Patient has significant PMDD symptoms 7 days prior to her onset of menses.  She has had the same significant side effect with every SSRI, which is excessive yawning.  Patient is doing well on her current meds but trying to come off of them gradually.  Patient was advised that she should not be cutting the Wellbutrin XL tablet, so we will change it to Wellbutrin SR 12-hour tablets at 100 mg twice daily.  The dosage is lower and she can gradually decrease to 1 a day before discontinuing it.  Change Lamictal to the 25 mg tablets twice daily for mood stabilizer.  She has been taking 50 mg once or twice daily most recently.  Refill Klonopin 0.5mg BID prn anxiety  Now that she has met her deductible, she is considering neuropsych testing to evaluate for ADHD.  Her insurance company may require a referral, and, if so, can send her to [facility] and [facility]    MEDS ORDERED DURING VISIT:  New Medications Ordered This Visit   Medications   • lamoTRIgine (LaMICtal) 25 MG tablet     Sig: Take 1 tablet by mouth 2 (Two) Times a Day.     Dispense:  180  tablet     Refill:  1   • buPROPion SR (Wellbutrin SR) 100 MG 12 hr tablet     Sig: Take 1 tablet by mouth 2 (Two) Times a Day.     Dispense:  180 tablet     Refill:  1   • clonazePAM (KlonoPIN) 0.5 MG tablet     Sig: Take 1 tablet by mouth 2 (Two) Times a Day As Needed for Anxiety.     Dispense:  60 tablet     Refill:  0       Return in about 6 months (around 11/17/2022).    This document has been electronically signed by Elizabeth Damon PA-C  May 17, 2022 10:56 EDT

## 2023-02-07 RX ORDER — BUPROPION HYDROCHLORIDE 100 MG/1
TABLET, EXTENDED RELEASE ORAL
Qty: 180 TABLET | Refills: 1 | OUTPATIENT
Start: 2023-02-07

## 2023-02-27 RX ORDER — BUPROPION HYDROCHLORIDE 100 MG/1
TABLET, EXTENDED RELEASE ORAL
Qty: 180 TABLET | Refills: 1 | OUTPATIENT
Start: 2023-02-27

## 2023-04-21 ENCOUNTER — HOSPITAL ENCOUNTER (OUTPATIENT)
Facility: HOSPITAL | Age: 38
Setting detail: OBSERVATION
Discharge: HOME | End: 2023-04-22
Attending: EMERGENCY MEDICINE | Admitting: EMERGENCY MEDICINE
Payer: COMMERCIAL

## 2023-04-21 ENCOUNTER — APPOINTMENT (OUTPATIENT)
Dept: CT IMAGING | Facility: HOSPITAL | Age: 38
End: 2023-04-21
Payer: COMMERCIAL

## 2023-04-21 DIAGNOSIS — K35.80 ACUTE APPENDICITIS, UNSPECIFIED ACUTE APPENDICITIS TYPE: Primary | ICD-10-CM

## 2023-04-21 DIAGNOSIS — K37 APPENDICITIS: ICD-10-CM

## 2023-04-21 LAB
ALBUMIN SERPL-MCNC: 4.7 G/DL (ref 3.5–5.2)
ALBUMIN/GLOB SERPL: 1.9 G/DL
ALP SERPL-CCNC: 62 U/L (ref 39–117)
ALT SERPL W P-5'-P-CCNC: 19 U/L (ref 1–33)
ANION GAP SERPL CALCULATED.3IONS-SCNC: 12 MMOL/L (ref 5–15)
AST SERPL-CCNC: 20 U/L (ref 1–32)
BACTERIA UR QL AUTO: ABNORMAL /HPF
BASOPHILS # BLD AUTO: 0.1 10*3/MM3 (ref 0–0.2)
BASOPHILS NFR BLD AUTO: 0.5 % (ref 0–1.5)
BILIRUB SERPL-MCNC: 0.3 MG/DL (ref 0–1.2)
BILIRUB UR QL STRIP: NEGATIVE
BUN SERPL-MCNC: 8 MG/DL (ref 6–20)
BUN/CREAT SERPL: 10.8 (ref 7–25)
CALCIUM SPEC-SCNC: 9.6 MG/DL (ref 8.6–10.5)
CHLORIDE SERPL-SCNC: 103 MMOL/L (ref 98–107)
CLARITY UR: CLEAR
CO2 SERPL-SCNC: 24 MMOL/L (ref 22–29)
COLOR UR: YELLOW
CREAT SERPL-MCNC: 0.74 MG/DL (ref 0.57–1)
DEPRECATED RDW RBC AUTO: 40.7 FL (ref 37–54)
EGFRCR SERPLBLD CKD-EPI 2021: 107 ML/MIN/1.73
EOSINOPHIL # BLD AUTO: 0.1 10*3/MM3 (ref 0–0.4)
EOSINOPHIL NFR BLD AUTO: 0.6 % (ref 0.3–6.2)
ERYTHROCYTE [DISTWIDTH] IN BLOOD BY AUTOMATED COUNT: 13.1 % (ref 12.3–15.4)
GLOBULIN UR ELPH-MCNC: 2.5 GM/DL
GLUCOSE SERPL-MCNC: 107 MG/DL (ref 65–99)
GLUCOSE UR STRIP-MCNC: NEGATIVE MG/DL
HCT VFR BLD AUTO: 42.7 % (ref 34–46.6)
HGB BLD-MCNC: 14.1 G/DL (ref 12–15.9)
HGB UR QL STRIP.AUTO: ABNORMAL
HYALINE CASTS UR QL AUTO: ABNORMAL /LPF
KETONES UR QL STRIP: NEGATIVE
LEUKOCYTE ESTERASE UR QL STRIP.AUTO: NEGATIVE
LIPASE SERPL-CCNC: 34 U/L (ref 13–60)
LYMPHOCYTES # BLD AUTO: 1.9 10*3/MM3 (ref 0.7–3.1)
LYMPHOCYTES NFR BLD AUTO: 12.7 % (ref 19.6–45.3)
MCH RBC QN AUTO: 29.6 PG (ref 26.6–33)
MCHC RBC AUTO-ENTMCNC: 33.1 G/DL (ref 31.5–35.7)
MCV RBC AUTO: 89.4 FL (ref 79–97)
MONOCYTES # BLD AUTO: 0.9 10*3/MM3 (ref 0.1–0.9)
MONOCYTES NFR BLD AUTO: 5.9 % (ref 5–12)
NEUTROPHILS NFR BLD AUTO: 12.2 10*3/MM3 (ref 1.7–7)
NEUTROPHILS NFR BLD AUTO: 80.3 % (ref 42.7–76)
NITRITE UR QL STRIP: NEGATIVE
NRBC BLD AUTO-RTO: 0 /100 WBC (ref 0–0.2)
PH UR STRIP.AUTO: 7 [PH] (ref 5–8)
PLATELET # BLD AUTO: 276 10*3/MM3 (ref 140–450)
PMV BLD AUTO: 8.4 FL (ref 6–12)
POTASSIUM SERPL-SCNC: 4.6 MMOL/L (ref 3.5–5.2)
PROT SERPL-MCNC: 7.2 G/DL (ref 6–8.5)
PROT UR QL STRIP: NEGATIVE
RBC # BLD AUTO: 4.77 10*6/MM3 (ref 3.77–5.28)
RBC # UR STRIP: ABNORMAL /HPF
REF LAB TEST METHOD: ABNORMAL
SODIUM SERPL-SCNC: 139 MMOL/L (ref 136–145)
SP GR UR STRIP: <=1.005 (ref 1–1.03)
SQUAMOUS #/AREA URNS HPF: ABNORMAL /HPF
UROBILINOGEN UR QL STRIP: ABNORMAL
WBC # UR STRIP: ABNORMAL /HPF
WBC NRBC COR # BLD: 15.2 10*3/MM3 (ref 3.4–10.8)

## 2023-04-21 PROCEDURE — G0378 HOSPITAL OBSERVATION PER HR: HCPCS

## 2023-04-21 PROCEDURE — 25010000002 CEFTRIAXONE PER 250 MG: Performed by: EMERGENCY MEDICINE

## 2023-04-21 PROCEDURE — 81001 URINALYSIS AUTO W/SCOPE: CPT | Performed by: EMERGENCY MEDICINE

## 2023-04-21 PROCEDURE — 83690 ASSAY OF LIPASE: CPT | Performed by: EMERGENCY MEDICINE

## 2023-04-21 PROCEDURE — 85025 COMPLETE CBC W/AUTO DIFF WBC: CPT | Performed by: EMERGENCY MEDICINE

## 2023-04-21 PROCEDURE — 80053 COMPREHEN METABOLIC PANEL: CPT | Performed by: EMERGENCY MEDICINE

## 2023-04-21 PROCEDURE — 25510000001 IOPAMIDOL PER 1 ML: Performed by: EMERGENCY MEDICINE

## 2023-04-21 PROCEDURE — 99285 EMERGENCY DEPT VISIT HI MDM: CPT

## 2023-04-21 PROCEDURE — 74177 CT ABD & PELVIS W/CONTRAST: CPT

## 2023-04-21 RX ORDER — SODIUM CHLORIDE 0.9 % (FLUSH) 0.9 %
10 SYRINGE (ML) INJECTION AS NEEDED
Status: DISCONTINUED | OUTPATIENT
Start: 2023-04-21 | End: 2023-04-22 | Stop reason: HOSPADM

## 2023-04-21 RX ORDER — METRONIDAZOLE 500 MG/100ML
500 INJECTION, SOLUTION INTRAVENOUS ONCE
Status: COMPLETED | OUTPATIENT
Start: 2023-04-21 | End: 2023-04-21

## 2023-04-21 RX ADMIN — METRONIDAZOLE 500 MG: 500 INJECTION, SOLUTION INTRAVENOUS at 22:38

## 2023-04-21 RX ADMIN — CEFTRIAXONE 1 G: 1 INJECTION, POWDER, FOR SOLUTION INTRAMUSCULAR; INTRAVENOUS at 22:38

## 2023-04-21 RX ADMIN — IOPAMIDOL 100 ML: 755 INJECTION, SOLUTION INTRAVENOUS at 21:02

## 2023-04-22 ENCOUNTER — ANESTHESIA (OUTPATIENT)
Dept: PERIOP | Facility: HOSPITAL | Age: 38
End: 2023-04-22
Payer: COMMERCIAL

## 2023-04-22 ENCOUNTER — ANESTHESIA EVENT (OUTPATIENT)
Dept: PERIOP | Facility: HOSPITAL | Age: 38
End: 2023-04-22
Payer: COMMERCIAL

## 2023-04-22 ENCOUNTER — APPOINTMENT (OUTPATIENT)
Dept: GENERAL RADIOLOGY | Facility: HOSPITAL | Age: 38
End: 2023-04-22
Payer: COMMERCIAL

## 2023-04-22 VITALS
SYSTOLIC BLOOD PRESSURE: 153 MMHG | DIASTOLIC BLOOD PRESSURE: 88 MMHG | HEIGHT: 64 IN | RESPIRATION RATE: 16 BRPM | OXYGEN SATURATION: 98 % | TEMPERATURE: 98.4 F | BODY MASS INDEX: 28.58 KG/M2 | HEART RATE: 86 BPM | WEIGHT: 167.4 LBS

## 2023-04-22 PROBLEM — K35.80 ACUTE APPENDICITIS: Status: RESOLVED | Noted: 2023-04-21 | Resolved: 2023-04-22

## 2023-04-22 LAB
ANION GAP SERPL CALCULATED.3IONS-SCNC: 11 MMOL/L (ref 5–15)
BASOPHILS # BLD AUTO: 0 10*3/MM3 (ref 0–0.2)
BASOPHILS NFR BLD AUTO: 0.4 % (ref 0–1.5)
BUN SERPL-MCNC: 6 MG/DL (ref 6–20)
BUN/CREAT SERPL: 9.7 (ref 7–25)
CALCIUM SPEC-SCNC: 8.7 MG/DL (ref 8.6–10.5)
CHLORIDE SERPL-SCNC: 105 MMOL/L (ref 98–107)
CO2 SERPL-SCNC: 24 MMOL/L (ref 22–29)
CREAT SERPL-MCNC: 0.62 MG/DL (ref 0.57–1)
DEPRECATED RDW RBC AUTO: 40.7 FL (ref 37–54)
EGFRCR SERPLBLD CKD-EPI 2021: 117.8 ML/MIN/1.73
EOSINOPHIL # BLD AUTO: 0.1 10*3/MM3 (ref 0–0.4)
EOSINOPHIL NFR BLD AUTO: 1.5 % (ref 0.3–6.2)
ERYTHROCYTE [DISTWIDTH] IN BLOOD BY AUTOMATED COUNT: 13.1 % (ref 12.3–15.4)
GLUCOSE SERPL-MCNC: 108 MG/DL (ref 65–99)
HCT VFR BLD AUTO: 40 % (ref 34–46.6)
HGB BLD-MCNC: 13.5 G/DL (ref 12–15.9)
LYMPHOCYTES # BLD AUTO: 2.4 10*3/MM3 (ref 0.7–3.1)
LYMPHOCYTES NFR BLD AUTO: 25.9 % (ref 19.6–45.3)
MCH RBC QN AUTO: 30.1 PG (ref 26.6–33)
MCHC RBC AUTO-ENTMCNC: 33.8 G/DL (ref 31.5–35.7)
MCV RBC AUTO: 89.2 FL (ref 79–97)
MONOCYTES # BLD AUTO: 0.6 10*3/MM3 (ref 0.1–0.9)
MONOCYTES NFR BLD AUTO: 6.9 % (ref 5–12)
NEUTROPHILS NFR BLD AUTO: 6.1 10*3/MM3 (ref 1.7–7)
NEUTROPHILS NFR BLD AUTO: 65.3 % (ref 42.7–76)
NRBC BLD AUTO-RTO: 0 /100 WBC (ref 0–0.2)
PLATELET # BLD AUTO: 241 10*3/MM3 (ref 140–450)
PMV BLD AUTO: 8.9 FL (ref 6–12)
POTASSIUM SERPL-SCNC: 3.5 MMOL/L (ref 3.5–5.2)
QT INTERVAL: 368 MS
RBC # BLD AUTO: 4.49 10*6/MM3 (ref 3.77–5.28)
SODIUM SERPL-SCNC: 140 MMOL/L (ref 136–145)
WBC NRBC COR # BLD: 9.3 10*3/MM3 (ref 3.4–10.8)

## 2023-04-22 PROCEDURE — 80048 BASIC METABOLIC PNL TOTAL CA: CPT | Performed by: EMERGENCY MEDICINE

## 2023-04-22 PROCEDURE — 96361 HYDRATE IV INFUSION ADD-ON: CPT

## 2023-04-22 PROCEDURE — 96365 THER/PROPH/DIAG IV INF INIT: CPT

## 2023-04-22 PROCEDURE — 25010000002 ONDANSETRON PER 1 MG: Performed by: NURSE ANESTHETIST, CERTIFIED REGISTERED

## 2023-04-22 PROCEDURE — 25010000002 DEXAMETHASONE SODIUM PHOSPHATE 20 MG/5ML SOLUTION: Performed by: NURSE ANESTHETIST, CERTIFIED REGISTERED

## 2023-04-22 PROCEDURE — G0378 HOSPITAL OBSERVATION PER HR: HCPCS

## 2023-04-22 PROCEDURE — 25010000002 FENTANYL CITRATE (PF) 100 MCG/2ML SOLUTION: Performed by: NURSE ANESTHETIST, CERTIFIED REGISTERED

## 2023-04-22 PROCEDURE — 25010000002 PROPOFOL 200 MG/20ML EMULSION: Performed by: NURSE ANESTHETIST, CERTIFIED REGISTERED

## 2023-04-22 PROCEDURE — 71045 X-RAY EXAM CHEST 1 VIEW: CPT

## 2023-04-22 PROCEDURE — 25010000002 MIDAZOLAM PER 1 MG: Performed by: NURSE ANESTHETIST, CERTIFIED REGISTERED

## 2023-04-22 PROCEDURE — 93010 ELECTROCARDIOGRAM REPORT: CPT | Performed by: INTERNAL MEDICINE

## 2023-04-22 PROCEDURE — 85025 COMPLETE CBC W/AUTO DIFF WBC: CPT | Performed by: EMERGENCY MEDICINE

## 2023-04-22 PROCEDURE — 93005 ELECTROCARDIOGRAM TRACING: CPT | Performed by: PHYSICIAN ASSISTANT

## 2023-04-22 PROCEDURE — 25010000002 CEFOXITIN PER 1 G: Performed by: SURGERY

## 2023-04-22 PROCEDURE — 25010000002 SUGAMMADEX 200 MG/2ML SOLUTION: Performed by: NURSE ANESTHETIST, CERTIFIED REGISTERED

## 2023-04-22 PROCEDURE — 88304 TISSUE EXAM BY PATHOLOGIST: CPT | Performed by: SURGERY

## 2023-04-22 PROCEDURE — 25010000002 SUCCINYLCHOLINE PER 20 MG: Performed by: NURSE ANESTHETIST, CERTIFIED REGISTERED

## 2023-04-22 DEVICE — ENDOPATH ETS45 2.5MM RELOADS (VASCULAR/THIN)
Type: IMPLANTABLE DEVICE | Site: ABDOMEN | Status: FUNCTIONAL
Brand: ENDOPATH

## 2023-04-22 RX ORDER — METRONIDAZOLE 500 MG/100ML
500 INJECTION, SOLUTION INTRAVENOUS EVERY 8 HOURS
Status: DISCONTINUED | OUTPATIENT
Start: 2023-04-22 | End: 2023-04-22 | Stop reason: HOSPADM

## 2023-04-22 RX ORDER — OXYCODONE HYDROCHLORIDE 5 MG/1
10 TABLET ORAL EVERY 4 HOURS PRN
Status: DISCONTINUED | OUTPATIENT
Start: 2023-04-22 | End: 2023-04-22 | Stop reason: HOSPADM

## 2023-04-22 RX ORDER — DEXTROSE AND SODIUM CHLORIDE 5; .45 G/100ML; G/100ML
100 INJECTION, SOLUTION INTRAVENOUS CONTINUOUS
Status: DISCONTINUED | OUTPATIENT
Start: 2023-04-22 | End: 2023-04-22 | Stop reason: HOSPADM

## 2023-04-22 RX ORDER — SUCCINYLCHOLINE CHLORIDE 20 MG/ML
INJECTION INTRAMUSCULAR; INTRAVENOUS AS NEEDED
Status: DISCONTINUED | OUTPATIENT
Start: 2023-04-22 | End: 2023-04-22 | Stop reason: SURG

## 2023-04-22 RX ORDER — MIDAZOLAM HYDROCHLORIDE 1 MG/ML
INJECTION INTRAMUSCULAR; INTRAVENOUS AS NEEDED
Status: DISCONTINUED | OUTPATIENT
Start: 2023-04-22 | End: 2023-04-22 | Stop reason: SURG

## 2023-04-22 RX ORDER — LABETALOL HYDROCHLORIDE 5 MG/ML
5 INJECTION, SOLUTION INTRAVENOUS
Status: DISCONTINUED | OUTPATIENT
Start: 2023-04-22 | End: 2023-04-22 | Stop reason: HOSPADM

## 2023-04-22 RX ORDER — SODIUM CHLORIDE 0.9 % (FLUSH) 0.9 %
10 SYRINGE (ML) INJECTION EVERY 12 HOURS SCHEDULED
Status: DISCONTINUED | OUTPATIENT
Start: 2023-04-22 | End: 2023-04-22 | Stop reason: HOSPADM

## 2023-04-22 RX ORDER — NALOXONE HCL 0.4 MG/ML
0.1 VIAL (ML) INJECTION
Status: DISCONTINUED | OUTPATIENT
Start: 2023-04-22 | End: 2023-04-22 | Stop reason: HOSPADM

## 2023-04-22 RX ORDER — OXYCODONE HYDROCHLORIDE 5 MG/1
5 TABLET ORAL ONCE AS NEEDED
Status: DISCONTINUED | OUTPATIENT
Start: 2023-04-22 | End: 2023-04-22 | Stop reason: HOSPADM

## 2023-04-22 RX ORDER — IPRATROPIUM BROMIDE AND ALBUTEROL SULFATE 2.5; .5 MG/3ML; MG/3ML
3 SOLUTION RESPIRATORY (INHALATION) ONCE AS NEEDED
Status: DISCONTINUED | OUTPATIENT
Start: 2023-04-22 | End: 2023-04-22 | Stop reason: HOSPADM

## 2023-04-22 RX ORDER — DIPHENHYDRAMINE HYDROCHLORIDE 50 MG/ML
12.5 INJECTION INTRAMUSCULAR; INTRAVENOUS
Status: DISCONTINUED | OUTPATIENT
Start: 2023-04-22 | End: 2023-04-22 | Stop reason: HOSPADM

## 2023-04-22 RX ORDER — FLUMAZENIL 0.1 MG/ML
0.2 INJECTION INTRAVENOUS AS NEEDED
Status: DISCONTINUED | OUTPATIENT
Start: 2023-04-22 | End: 2023-04-22 | Stop reason: HOSPADM

## 2023-04-22 RX ORDER — NITROGLYCERIN 0.4 MG/1
0.4 TABLET SUBLINGUAL
Status: DISCONTINUED | OUTPATIENT
Start: 2023-04-22 | End: 2023-04-22 | Stop reason: HOSPADM

## 2023-04-22 RX ORDER — NALOXONE HCL 0.4 MG/ML
0.4 VIAL (ML) INJECTION AS NEEDED
Status: DISCONTINUED | OUTPATIENT
Start: 2023-04-22 | End: 2023-04-22 | Stop reason: HOSPADM

## 2023-04-22 RX ORDER — SODIUM CHLORIDE 0.9 % (FLUSH) 0.9 %
10 SYRINGE (ML) INJECTION AS NEEDED
Status: DISCONTINUED | OUTPATIENT
Start: 2023-04-22 | End: 2023-04-22

## 2023-04-22 RX ORDER — LIDOCAINE HYDROCHLORIDE 40 MG/ML
SOLUTION TOPICAL AS NEEDED
Status: DISCONTINUED | OUTPATIENT
Start: 2023-04-22 | End: 2023-04-22 | Stop reason: SURG

## 2023-04-22 RX ORDER — ONDANSETRON 2 MG/ML
INJECTION INTRAMUSCULAR; INTRAVENOUS AS NEEDED
Status: DISCONTINUED | OUTPATIENT
Start: 2023-04-22 | End: 2023-04-22 | Stop reason: SURG

## 2023-04-22 RX ORDER — BUPROPION HYDROCHLORIDE 100 MG/1
100 TABLET, EXTENDED RELEASE ORAL 2 TIMES DAILY
Status: DISCONTINUED | OUTPATIENT
Start: 2023-04-22 | End: 2023-04-22 | Stop reason: HOSPADM

## 2023-04-22 RX ORDER — SCOLOPAMINE TRANSDERMAL SYSTEM 1 MG/1
1 PATCH, EXTENDED RELEASE TRANSDERMAL ONCE
Status: DISCONTINUED | OUTPATIENT
Start: 2023-04-22 | End: 2023-04-22

## 2023-04-22 RX ORDER — DEXMEDETOMIDINE HYDROCHLORIDE 100 UG/ML
INJECTION, SOLUTION INTRAVENOUS AS NEEDED
Status: DISCONTINUED | OUTPATIENT
Start: 2023-04-22 | End: 2023-04-22 | Stop reason: SURG

## 2023-04-22 RX ORDER — ROCURONIUM BROMIDE 10 MG/ML
INJECTION, SOLUTION INTRAVENOUS AS NEEDED
Status: DISCONTINUED | OUTPATIENT
Start: 2023-04-22 | End: 2023-04-22 | Stop reason: SURG

## 2023-04-22 RX ORDER — HYDRALAZINE HYDROCHLORIDE 20 MG/ML
5 INJECTION INTRAMUSCULAR; INTRAVENOUS
Status: DISCONTINUED | OUTPATIENT
Start: 2023-04-22 | End: 2023-04-22 | Stop reason: HOSPADM

## 2023-04-22 RX ORDER — ALPRAZOLAM 0.25 MG/1
0.25 TABLET ORAL NIGHTLY PRN
Status: DISCONTINUED | OUTPATIENT
Start: 2023-04-22 | End: 2023-04-22 | Stop reason: HOSPADM

## 2023-04-22 RX ORDER — ACETAMINOPHEN 325 MG/1
650 TABLET ORAL EVERY 4 HOURS PRN
Status: DISCONTINUED | OUTPATIENT
Start: 2023-04-22 | End: 2023-04-22 | Stop reason: HOSPADM

## 2023-04-22 RX ORDER — SODIUM CHLORIDE, SODIUM LACTATE, POTASSIUM CHLORIDE, CALCIUM CHLORIDE 600; 310; 30; 20 MG/100ML; MG/100ML; MG/100ML; MG/100ML
1000 INJECTION, SOLUTION INTRAVENOUS CONTINUOUS
Status: DISCONTINUED | OUTPATIENT
Start: 2023-04-22 | End: 2023-04-22 | Stop reason: HOSPADM

## 2023-04-22 RX ORDER — HYDROCODONE BITARTRATE AND ACETAMINOPHEN 5; 325 MG/1; MG/1
1 TABLET ORAL EVERY 4 HOURS PRN
Qty: 12 TABLET | Refills: 0 | Status: SHIPPED | OUTPATIENT
Start: 2023-04-22

## 2023-04-22 RX ORDER — HYDROCODONE BITARTRATE AND ACETAMINOPHEN 5; 325 MG/1; MG/1
1 TABLET ORAL EVERY 4 HOURS PRN
Status: DISCONTINUED | OUTPATIENT
Start: 2023-04-22 | End: 2023-04-22 | Stop reason: HOSPADM

## 2023-04-22 RX ORDER — BUPIVACAINE HYDROCHLORIDE 5 MG/ML
INJECTION, SOLUTION PERINEURAL AS NEEDED
Status: DISCONTINUED | OUTPATIENT
Start: 2023-04-22 | End: 2023-04-22 | Stop reason: HOSPADM

## 2023-04-22 RX ORDER — PROPOFOL 10 MG/ML
INJECTION, EMULSION INTRAVENOUS AS NEEDED
Status: DISCONTINUED | OUTPATIENT
Start: 2023-04-22 | End: 2023-04-22 | Stop reason: SURG

## 2023-04-22 RX ORDER — SODIUM CHLORIDE 0.9 % (FLUSH) 0.9 %
10 SYRINGE (ML) INJECTION AS NEEDED
Status: DISCONTINUED | OUTPATIENT
Start: 2023-04-22 | End: 2023-04-22 | Stop reason: HOSPADM

## 2023-04-22 RX ORDER — DIPHENHYDRAMINE HYDROCHLORIDE 50 MG/ML
12.5 INJECTION INTRAMUSCULAR; INTRAVENOUS ONCE AS NEEDED
Status: DISCONTINUED | OUTPATIENT
Start: 2023-04-22 | End: 2023-04-22 | Stop reason: HOSPADM

## 2023-04-22 RX ORDER — FENTANYL CITRATE 50 UG/ML
INJECTION, SOLUTION INTRAMUSCULAR; INTRAVENOUS AS NEEDED
Status: DISCONTINUED | OUTPATIENT
Start: 2023-04-22 | End: 2023-04-22 | Stop reason: SURG

## 2023-04-22 RX ORDER — SODIUM CHLORIDE 9 MG/ML
40 INJECTION, SOLUTION INTRAVENOUS AS NEEDED
Status: DISCONTINUED | OUTPATIENT
Start: 2023-04-22 | End: 2023-04-22 | Stop reason: HOSPADM

## 2023-04-22 RX ORDER — ONDANSETRON 2 MG/ML
4 INJECTION INTRAMUSCULAR; INTRAVENOUS EVERY 6 HOURS PRN
Status: DISCONTINUED | OUTPATIENT
Start: 2023-04-22 | End: 2023-04-22 | Stop reason: HOSPADM

## 2023-04-22 RX ORDER — MEPERIDINE HYDROCHLORIDE 25 MG/ML
12.5 INJECTION INTRAMUSCULAR; INTRAVENOUS; SUBCUTANEOUS
Status: DISCONTINUED | OUTPATIENT
Start: 2023-04-22 | End: 2023-04-22 | Stop reason: HOSPADM

## 2023-04-22 RX ORDER — KETAMINE HCL IN NACL, ISO-OSM 100MG/10ML
SYRINGE (ML) INJECTION AS NEEDED
Status: DISCONTINUED | OUTPATIENT
Start: 2023-04-22 | End: 2023-04-22 | Stop reason: SURG

## 2023-04-22 RX ORDER — CLONAZEPAM 0.5 MG/1
0.5 TABLET ORAL 2 TIMES DAILY PRN
Status: DISCONTINUED | OUTPATIENT
Start: 2023-04-22 | End: 2023-04-22 | Stop reason: HOSPADM

## 2023-04-22 RX ORDER — PROPOFOL 10 MG/ML
INJECTION, EMULSION INTRAVENOUS CONTINUOUS PRN
Status: DISCONTINUED | OUTPATIENT
Start: 2023-04-22 | End: 2023-04-22 | Stop reason: SURG

## 2023-04-22 RX ORDER — ONDANSETRON 2 MG/ML
4 INJECTION INTRAMUSCULAR; INTRAVENOUS ONCE AS NEEDED
Status: DISCONTINUED | OUTPATIENT
Start: 2023-04-22 | End: 2023-04-22 | Stop reason: HOSPADM

## 2023-04-22 RX ORDER — EPHEDRINE SULFATE 5 MG/ML
5 INJECTION INTRAVENOUS ONCE AS NEEDED
Status: DISCONTINUED | OUTPATIENT
Start: 2023-04-22 | End: 2023-04-22 | Stop reason: HOSPADM

## 2023-04-22 RX ORDER — DEXAMETHASONE SODIUM PHOSPHATE 4 MG/ML
INJECTION, SOLUTION INTRA-ARTICULAR; INTRALESIONAL; INTRAMUSCULAR; INTRAVENOUS; SOFT TISSUE AS NEEDED
Status: DISCONTINUED | OUTPATIENT
Start: 2023-04-22 | End: 2023-04-22 | Stop reason: SURG

## 2023-04-22 RX ORDER — CHOLECALCIFEROL (VITAMIN D3) 125 MCG
5 CAPSULE ORAL NIGHTLY PRN
Status: DISCONTINUED | OUTPATIENT
Start: 2023-04-22 | End: 2023-04-22 | Stop reason: HOSPADM

## 2023-04-22 RX ORDER — LIDOCAINE HYDROCHLORIDE 10 MG/ML
INJECTION, SOLUTION EPIDURAL; INFILTRATION; INTRACAUDAL; PERINEURAL AS NEEDED
Status: DISCONTINUED | OUTPATIENT
Start: 2023-04-22 | End: 2023-04-22 | Stop reason: SURG

## 2023-04-22 RX ADMIN — HYDROCODONE BITARTRATE AND ACETAMINOPHEN 1 TABLET: 5; 325 TABLET ORAL at 17:49

## 2023-04-22 RX ADMIN — METRONIDAZOLE 500 MG: 500 INJECTION, SOLUTION INTRAVENOUS at 05:14

## 2023-04-22 RX ADMIN — ROCURONIUM BROMIDE 5 MG: 10 INJECTION, SOLUTION INTRAVENOUS at 10:35

## 2023-04-22 RX ADMIN — PROPOFOL 150 MCG/KG/MIN: 10 INJECTION, EMULSION INTRAVENOUS at 10:36

## 2023-04-22 RX ADMIN — FENTANYL CITRATE 50 MCG: 50 INJECTION, SOLUTION INTRAMUSCULAR; INTRAVENOUS at 10:40

## 2023-04-22 RX ADMIN — ONDANSETRON 4 MG: 2 INJECTION INTRAMUSCULAR; INTRAVENOUS at 11:17

## 2023-04-22 RX ADMIN — LIDOCAINE HYDROCHLORIDE 1 EACH: 40 SOLUTION TOPICAL at 10:36

## 2023-04-22 RX ADMIN — ROCURONIUM BROMIDE 35 MG: 10 INJECTION, SOLUTION INTRAVENOUS at 10:39

## 2023-04-22 RX ADMIN — SUGAMMADEX 150 MG: 100 INJECTION, SOLUTION INTRAVENOUS at 11:17

## 2023-04-22 RX ADMIN — CEFOXITIN SODIUM 2 G: 2 POWDER, FOR SOLUTION INTRAVENOUS at 10:39

## 2023-04-22 RX ADMIN — PROPOFOL 180 MG: 10 INJECTION, EMULSION INTRAVENOUS at 10:35

## 2023-04-22 RX ADMIN — SUCCINYLCHOLINE CHLORIDE 120 MG: 20 INJECTION, SOLUTION INTRAMUSCULAR; INTRAVENOUS at 10:35

## 2023-04-22 RX ADMIN — DEXMEDETOMIDINE HYDROCHLORIDE 4 MCG: 100 INJECTION, SOLUTION INTRAVENOUS at 11:06

## 2023-04-22 RX ADMIN — DEXMEDETOMIDINE HYDROCHLORIDE 4 MCG: 100 INJECTION, SOLUTION INTRAVENOUS at 11:00

## 2023-04-22 RX ADMIN — CEFOXITIN SODIUM 2 G: 2 POWDER, FOR SOLUTION INTRAVENOUS at 16:57

## 2023-04-22 RX ADMIN — MIDAZOLAM 2 MG: 1 INJECTION INTRAMUSCULAR; INTRAVENOUS at 10:32

## 2023-04-22 RX ADMIN — BUPROPION HYDROCHLORIDE 100 MG: 100 TABLET, FILM COATED, EXTENDED RELEASE ORAL at 09:17

## 2023-04-22 RX ADMIN — SODIUM CHLORIDE, SODIUM LACTATE, POTASSIUM CHLORIDE, AND CALCIUM CHLORIDE: .6; .31; .03; .02 INJECTION, SOLUTION INTRAVENOUS at 10:29

## 2023-04-22 RX ADMIN — ALPRAZOLAM 0.25 MG: 0.25 TABLET ORAL at 15:21

## 2023-04-22 RX ADMIN — DEXAMETHASONE SODIUM PHOSPHATE 8 MG: 4 INJECTION, SOLUTION INTRAMUSCULAR; INTRAVENOUS at 11:17

## 2023-04-22 RX ADMIN — Medication 20 MG: at 10:54

## 2023-04-22 RX ADMIN — FENTANYL CITRATE 50 MCG: 50 INJECTION, SOLUTION INTRAMUSCULAR; INTRAVENOUS at 10:35

## 2023-04-22 RX ADMIN — SCOPALAMINE 1 PATCH: 1 PATCH, EXTENDED RELEASE TRANSDERMAL at 10:22

## 2023-04-22 RX ADMIN — LIDOCAINE HYDROCHLORIDE 40 MG: 10 INJECTION, SOLUTION EPIDURAL; INFILTRATION; INTRACAUDAL; PERINEURAL at 10:35

## 2023-04-22 RX ADMIN — DEXTROSE AND SODIUM CHLORIDE 100 ML/HR: 5; 450 INJECTION, SOLUTION INTRAVENOUS at 02:23

## 2023-04-22 RX ADMIN — METRONIDAZOLE 500 MG: 500 INJECTION, SOLUTION INTRAVENOUS at 15:21

## 2023-04-22 NOTE — DISCHARGE SUMMARY
[facility]    Divine Brush DEWAYNE DOE    CHIEF COMPLAINT:     Abdominal pain    HISTORY OF PRESENT ILLNESS:    Obtained from ED provider HPI on 4/21/2023:  History of Present Illness  Chief complaint: Patient is a pleasant 37-year-old female.  She presents with abdominal pain.  Its been in the right lower quadrant since this morning.  It started out not severe and has progressed and become severe through the day.  She states things like if she will have to cough to clear her throat or sudden movements will make it worse.  It is all localized to the right side and some in the right back.  If she does push to have a bowel movement or urinates she feels some discomfort as well.  She has not had loss of appetite to this point.  No fever that she notes.  She is never anything like this before.  She has had a hysterectomy in the past.  She has never had ovarian cyst.    04/22/23:  Patient confirms the HPI noted above including abdominal pain which began shortly after waking on the previous morning.  He was initially noted is somewhat diffuse and vague become being more localized throughout the day in the right lower quadrant made worse with movement especially walking.  Pain was rated at 6/10.  No nausea, vomiting, changes in bowel or bladder habits or fever reported.  Patient does note having previous hysterectomy with some associated postoperative nausea and vomiting.          Past Medical History:   Diagnosis Date   • Anxiety    • PMDD (premenstrual dysphoric disorder)      History reviewed. No pertinent surgical history.  Family History   Problem Relation Age of Onset   • Depression Father    • Post-traumatic stress disorder Father    • Anxiety disorder Father      Social History     Tobacco Use   • Smoking status: Never   • Smokeless tobacco: Never   Vaping Use   • Vaping Use: Never used   Substance Use Topics   • Alcohol use: Yes     Alcohol/week: 2.0 standard drinks     Types: 2 Glasses of  wine per week     Comment: social   • Drug use: No     Medications Prior to Admission   Medication Sig Dispense Refill Last Dose   • buPROPion SR (Wellbutrin SR) 100 MG 12 hr tablet Take 1 tablet by mouth 2 (Two) Times a Day. 180 tablet 1 4/21/2023   • clonazePAM (KlonoPIN) 0.5 MG tablet Take 1 tablet by mouth 2 (Two) Times a Day As Needed for Anxiety. 60 tablet 0 Past Month   • lamoTRIgine (LaMICtal) 25 MG tablet Take 1 tablet by mouth 2 (Two) Times a Day. (Patient taking differently: Take 1 tablet by mouth 2 (Two) Times a Day As Needed.) 180 tablet 1 Past Month   • azithromycin (Zithromax) 250 MG tablet Take 2 tablets the first day, then 1 tablet daily for 4 days. 6 tablet 0      Allergies:  Penicillin g    Immunization History   Administered Date(s) Administered   • COVID-19 (PFIZER) PURPLE CAP 01/22/2021, 02/12/2021           REVIEW OF SYSTEMS:    Review of Systems   Constitutional: Negative.   HENT: Negative.    Eyes: Negative.    Cardiovascular: Negative.    Respiratory: Negative.    Skin: Negative.    Musculoskeletal: Negative.    Gastrointestinal: Positive for abdominal pain.   Genitourinary: Negative.    Neurological: Negative.    Psychiatric/Behavioral: Negative.          Vital Signs  Temp:  [97.2 °F (36.2 °C)-98.9 °F (37.2 °C)] 98.5 °F (36.9 °C)  Heart Rate:  [] 100  Resp:  [13-18] 16  BP: (107-162)/(56-95) 162/95          Physical Exam:  Physical Exam  Vitals reviewed.   Constitutional:       General: She is not in acute distress.     Appearance: Normal appearance. She is normal weight. She is not ill-appearing, toxic-appearing or diaphoretic.   HENT:      Head: Normocephalic.      Right Ear: External ear normal.      Left Ear: External ear normal.      Nose: Nose normal.      Mouth/Throat:      Mouth: Mucous membranes are moist.   Eyes:      Extraocular Movements: Extraocular movements intact.   Cardiovascular:      Rate and Rhythm: Normal rate and regular rhythm.      Pulses: Normal pulses.    Pulmonary:      Effort: Pulmonary effort is normal.      Breath sounds: Normal breath sounds.   Abdominal:      General: Bowel sounds are normal.      Palpations: Abdomen is soft.      Tenderness: There is abdominal tenderness.   Musculoskeletal:      Cervical back: Normal range of motion.      Right lower leg: No edema.      Left lower leg: No edema.   Skin:     General: Skin is warm and dry.      Capillary Refill: Capillary refill takes less than 2 seconds.   Neurological:      General: No focal deficit present.      Mental Status: She is alert.   Psychiatric:         Mood and Affect: Mood normal.         Behavior: Behavior normal.         Thought Content: Thought content normal.         Judgment: Judgment normal.           Emotional Behavior:   Normal   Debilities:  None  Results Review:    I reviewed the patient's new clinical results.  Lab Results (most recent)     Procedure Component Value Units Date/Time    Basic Metabolic Panel [[id number]]  (Abnormal) Collected: 04/22/23 0515    Specimen: Blood Updated: 04/22/23 0730     Glucose 108 mg/dL      BUN 6 mg/dL      Creatinine 0.62 mg/dL      Sodium 140 mmol/L      Potassium 3.5 mmol/L      Chloride 105 mmol/L      CO2 24.0 mmol/L      Calcium 8.7 mg/dL      BUN/Creatinine Ratio 9.7     Anion Gap 11.0 mmol/L      eGFR 117.8 mL/min/1.73     Narrative:      GFR Normal >60  Chronic Kidney Disease <60  Kidney Failure <15      CBC Auto Differential [[id number]]  (Normal) Collected: 04/22/23 0515    Specimen: Blood Updated: 04/22/23 0702     WBC 9.30 10*3/mm3      RBC 4.49 10*6/mm3      Hemoglobin 13.5 g/dL      Hematocrit 40.0 %      MCV 89.2 fL      MCH 30.1 pg      MCHC 33.8 g/dL      RDW 13.1 %      RDW-SD 40.7 fl      MPV 8.9 fL      Platelets 241 10*3/mm3      Neutrophil % 65.3 %      Lymphocyte % 25.9 %      Monocyte % 6.9 %      Eosinophil % 1.5 %      Basophil % 0.4 %      Neutrophils, Absolute 6.10 10*3/mm3      Lymphocytes, Absolute 2.40 10*3/mm3       Monocytes, Absolute 0.60 10*3/mm3      Eosinophils, Absolute 0.10 10*3/mm3      Basophils, Absolute 0.00 10*3/mm3      nRBC 0.0 /100 WBC     Comprehensive Metabolic Panel [[id number]]  (Abnormal) Collected: 04/21/23 2001    Specimen: Blood Updated: 04/21/23 2031     Glucose 107 mg/dL      BUN 8 mg/dL      Creatinine 0.74 mg/dL      Sodium 139 mmol/L      Potassium 4.6 mmol/L      Comment: Slight hemolysis detected by analyzer. Results may be affected.        Chloride 103 mmol/L      CO2 24.0 mmol/L      Calcium 9.6 mg/dL      Total Protein 7.2 g/dL      Albumin 4.7 g/dL      ALT (SGPT) 19 U/L      AST (SGOT) 20 U/L      Alkaline Phosphatase 62 U/L      Total Bilirubin 0.3 mg/dL      Globulin 2.5 gm/dL      A/G Ratio 1.9 g/dL      BUN/Creatinine Ratio 10.8     Anion Gap 12.0 mmol/L      eGFR 107.0 mL/min/1.73     Narrative:      GFR Normal >60  Chronic Kidney Disease <60  Kidney Failure <15      Lipase [[id number]]  (Normal) Collected: 04/21/23 2001    Specimen: Blood Updated: 04/21/23 2031     Lipase 34 U/L     Urinalysis, Microscopic Only - Urine, Clean Catch [[id number]]  (Abnormal) Collected: 04/21/23 2002    Specimen: Urine, Clean Catch Updated: 04/21/23 2024     RBC, UA 0-2 /HPF      WBC, UA None Seen /HPF      Comment: Urine culture not indicated.        Bacteria, UA None Seen /HPF      Squamous Epithelial Cells, UA 7-12 /HPF      Hyaline Casts, UA None Seen /LPF      Methodology Manual Light Microscopy    Urinalysis With Culture If Indicated - Urine, Clean Catch [[id number]]  (Abnormal) Collected: 04/21/23 2002    Specimen: Urine, Clean Catch Updated: 04/21/23 2015     Color, UA Yellow     Appearance, UA Clear     pH, UA 7.0     Specific Gravity, UA <=1.005     Glucose, UA Negative     Ketones, UA Negative     Bilirubin, UA Negative     Blood, UA Trace     Protein, UA Negative     Leuk Esterase, UA Negative     Nitrite, UA Negative     Urobilinogen, UA 0.2 E.U./dL    Narrative:      In absence of clinical  symptoms, the presence of pyuria, bacteria, and/or nitrites on the urinalysis result does not correlate with infection.    CBC & Differential [[id number]]  (Abnormal) Collected: 04/21/23 2001    Specimen: Blood Updated: 04/21/23 2011    Narrative:      The following orders were created for panel order CBC & Differential.  Procedure                               Abnormality         Status                     ---------                               -----------         ------                     CBC Auto Differential[[id number]]        Abnormal            Final result                 Please view results for these tests on the individual orders.    CBC Auto Differential [[id number]]  (Abnormal) Collected: 04/21/23 2001    Specimen: Blood Updated: 04/21/23 2011     WBC 15.20 10*3/mm3      RBC 4.77 10*6/mm3      Hemoglobin 14.1 g/dL      Hematocrit 42.7 %      MCV 89.4 fL      MCH 29.6 pg      MCHC 33.1 g/dL      RDW 13.1 %      RDW-SD 40.7 fl      MPV 8.4 fL      Platelets 276 10*3/mm3      Neutrophil % 80.3 %      Lymphocyte % 12.7 %      Monocyte % 5.9 %      Eosinophil % 0.6 %      Basophil % 0.5 %      Neutrophils, Absolute 12.20 10*3/mm3      Lymphocytes, Absolute 1.90 10*3/mm3      Monocytes, Absolute 0.90 10*3/mm3      Eosinophils, Absolute 0.10 10*3/mm3      Basophils, Absolute 0.10 10*3/mm3      nRBC 0.0 /100 WBC           Imaging Results (Most Recent)     Procedure Component Value Units Date/Time    XR Chest 1 View [[id number]] Collected: 04/22/23 1446     Updated: 04/22/23 1448    Narrative:      XR CHEST 1 VW    Date of Exam: 4/22/2023 2:22 PM EDT    Indication: chest pain, SOB    Comparison: None available.    Findings:   The lungs are well aerated without focal consolidation or discrete pulmonary mass. No pleural effusion or pneumothorax is identified. The cardiomediastinal silhouette and pulmonary vasculature appear within normal limits. No acute or suspicious osseous   lesion is identified.      Impression:       Impression:  No acute radiographic abnormality is identified.    Electronically Signed: Kiko Benitez    4/22/2023 2:46 PM EDT    Workstation ID: [id number]    CT Abdomen Pelvis With Contrast [[id number]] Collected: 04/21/23 2125     Updated: 04/21/23 2131    Narrative:      CT ABDOMEN PELVIS W CONTRAST    Date of Exam: 4/21/2023 8:57 PM EDT    Indication: rlq pain.    Comparison: None available.    Technique: Axial CT images were obtained of the abdomen and pelvis following the uneventful intravenous administration of iodinated contrast. Sagittal and coronal reconstructions were performed.  Automated exposure control and iterative reconstruction   methods were used.        Findings:  The lung bases are clear.    The liver, gallbladder, adrenal glands, kidneys, spleen, and pancreas are unremarkable.    The stomach appears normal. The small bowel appears normal in caliber and configuration. The colon appears normal. The appendix is mildly enlarged at 7 mm with hyperemia, suggesting early developing appendicitis. There is mild pelvic ascites. There is no   loculated collection to suggest an abscess. No abnormally enlarged lymph nodes are identified.    The rectum and urinary bladder are unremarkable. The uterus is surgically absent.    No aggressive osseous lesions are identified.      Impression:      Impression:  1.Mildly enlarged appendix with associated hyperemia, concerning for early developing appendicitis.  2.Mild pelvic ascites. No evidence of abscess.            Electronically Signed: Will Guevara    4/21/2023 9:29 PM EDT    Workstation ID: [id number]        reviewed    ECG/EMG Results (most recent)     Procedure Component Value Units Date/Time    ECG 12 Lead Chest Pain [[id number]] Collected: 04/22/23 1422     Updated: 04/22/23 1424     QT Interval 368 ms     Narrative:      HEART RATE= 95  bpm  RR Interval= 632  ms  TX Interval= 150  ms  P Horizontal Axis=   deg  P Front Axis= 40  deg  QRSD Interval= 98   ms  QT Interval= 368  ms  QRS Axis= 48  deg  T Wave Axis= 49  deg  - NORMAL ECG -  Sinus rhythm  Electronically Signed By:   Date and Time of Study: 2023-04-22 14:22:28        not reviewed            Microbiology Results (last 10 days)     ** No results found for the last 240 hours. **          Assessment & Plan     * No active hospital problems. *       Appendicitis  Lab Results   Component Value Date    WBC 9.30 04/22/2023    AST 20 04/21/2023    ALT 19 04/21/2023    ALKPHOS 62 04/21/2023    BILITOT 0.3 04/21/2023    LIPASE 34 04/21/2023   -WBCs initially 15.20  -UA shows trace blood but was otherwise unremarkable  -CT of abdomen and pelvis: Mildly enlarged appendix with associated hyperemia concerning for early developing appendicitis with mild pelvic ascites without evidence of abscess also noted  -In the ED patient given Rocephin, Flagyl  -General surgery consulted who recommended laparoscopic appendectomy which was performed successfully on 4/22/2023 with no complications and minimal blood loss noted  -diet was resumed which patient tolerated without complication        I discussed the patients findings and my recommendations with patient and nursing staff.     Discharge Diagnosis:      * No active hospital problems. *      Hospital Course  Patient is a 37 y.o. female presented with abdominal pain with an HPI noted above.  Leukocytosis with WBCs of 15.20 were noted initially and trended to 9.30 on the morning following admission.  UA showed trace blood but was otherwise unremarkable and CT of abdomen and pelvis was obtained which showed a mildly enlarged appendix with associated hyperemia as well as mild pelvic ascites without evidence of abscess.  She received Rocephin as well as Flagyl in the ED and general surgery was consulted who recommended laparoscopic appendectomy which was performed successfully with minimal blood loss and no complications on 4/22/2023.  Diet was advanced which patient tolerated  without complications.  At this time patient is felt to be in good condition for discharge with close follow-up with her PCP as well as surgery on an outpatient basis.  Her full testing/results and plan were discussed with patient along with concerning/alarm symptoms for which to call 911/return to the ED. Short course of Norco will be prescribed at discharge.  All questions were answered she verbalizes understanding and agreement.    Past Medical History:     Past Medical History:   Diagnosis Date   • Anxiety    • PMDD (premenstrual dysphoric disorder)        Past Surgical History:   History reviewed. No pertinent surgical history.    Social History:   Social History     Socioeconomic History   • Marital status:    Tobacco Use   • Smoking status: Never   • Smokeless tobacco: Never   Vaping Use   • Vaping Use: Never used   Substance and Sexual Activity   • Alcohol use: Yes     Alcohol/week: 2.0 standard drinks     Types: 2 Glasses of wine per week     Comment: social   • Drug use: No   • Sexual activity: Yes     Partners: Male     Birth control/protection: Surgical       Procedures Performed    Procedure(s):  APPENDECTOMY LAPAROSCOPIC  -------------------       Consults:   Consults     Date and Time Order Name Status Description    4/21/2023 10:54 PM Inpatient General Surgery Consult Completed     4/21/2023  9:36 PM Surgery (on-call MD unless specified)            Condition on Discharge:     Stable    Discharge Disposition  Home or Self Care    Discharge Medications     Discharge Medications      New Medications      Instructions Start Date   HYDROcodone-acetaminophen 5-325 MG per tablet  Commonly known as: NORCO   1 tablet, Oral, Every 4 Hours PRN         Continue These Medications      Instructions Start Date   buPROPion  MG 12 hr tablet  Commonly known as: Wellbutrin SR   100 mg, Oral, 2 Times Daily         Stop These Medications    azithromycin 250 MG tablet  Commonly known as: Zithromax      clonazePAM 0.5 MG tablet  Commonly known as: KlonoPIN        ASK your doctor about these medications      Instructions Start Date   lamoTRIgine 25 MG tablet  Commonly known as: LaMICtal   25 mg, Oral, 2 Times Daily             Discharge Diet:     Activity at Discharge:   Activity Instructions     Driving Restrictions      Type of Restriction: Driving    Driving Restrictions: No Driving While Taking Narcotics          Follow-up Appointments  No future appointments.  Additional Instructions for the Follow-ups that You Need to Schedule     Discharge Follow-up with PCP   As directed       Currently Documented PCP:    Divine Brush APRN    PCP Phone Number:    [phone]     Follow Up Details: 5 to 7 days         Discharge Follow-up with Specified Provider: General surgery   As directed      To: General surgery    Follow Up Details: As instructed               Test Results Pending at Discharge  Pending Labs     Order Current Status    Tissue Pathology Exam Collected (04/22/23 1113)           Risk for Readmission (LACE) Score: 1 (4/22/2023  6:01 AM)      Greater than 30 minutes spent in discharge activities for this patient    Landon Lombardo PA-C  04/22/23  17:24 EDT

## 2023-04-22 NOTE — ANESTHESIA PROCEDURE NOTES
Airway  Urgency: emergent    Date/Time: 4/22/2023 10:36 AM  Airway not difficult    General Information and Staff    Patient location during procedure: OR  Anesthesiologist: Efra Guevara MD  CRNA/CAA: Tanya Garcia CRNA    Consent for Airway (if performed for an anesthetic, see related documentation for consents)  Patient identity confirmed: verbally with patient and arm band  Consent: The procedure was performed in an emergent situation. Verbal consent obtained. Written consent obtained.  Risks and benefits: risks, benefits and alternatives were discussed  Consent given by: patient      Indications and Patient Condition  Indications for airway management: airway protection    Preoxygenated: yes  MILS not maintained throughout  Mask difficulty assessment: 2 - vent by mask + OA or adjuvant +/- NMBA    Final Airway Details  Final airway type: endotracheal airway      Successful airway: ETT  Cuffed: yes   Successful intubation technique: video laryngoscopy  Facilitating devices/methods: intubating stylet  Endotracheal tube insertion site: oral  Blade: Joseph  Blade size: 3  ETT size (mm): 7.0  Cormack-Lehane Classification: grade I - full view of glottis  Placement verified by: capnometry   Measured from: lips  ETT/EBT  to lips (cm): 20  Number of attempts at approach: 1  Assessment: lips, teeth, and gum same as pre-op and atraumatic intubation    Additional Comments  NPO > 8 hrs; pt denies any nausea or vomiting

## 2023-04-22 NOTE — PLAN OF CARE
Goal Outcome Evaluation:  Plan of Care Reviewed With: patient           Outcome Evaluation: Pt had lap appy with no complications.  Did have episode of anxiety that required xanax for relief.  Tolerated meal well. Will continue to monitor.

## 2023-04-22 NOTE — CONSULTS
GENERAL SURGERY CONSULT    Referring Provider: Ced  Reason for Consultation: Abdominal pain, appendicitis    Patient Care Team:  Divine Brush APRN as PCP - General (Nurse Practitioner)    Chief complaint lower abdominal pain    Subjective .     History of present illness: 37-year-old lady who presented to the emergency department yesterday evening with complaints of primarily lower abdominal pain which had begun around the time that she woke up on Friday morning.  She notes that the pain started out as a fairly vague type of pain and progressed to be more localized in the right lower quadrant with a sharp constant pain in the region.  This pain was exacerbated by urinating, attempting to defecate, and coughing along with other movements.  She states that she was not very hungry throughout the day yesterday.  Given the persistence of the pain she came to the emergency department yesterday evening.  Work-up showed a leukocytosis and a CT scan confirmed uncomplicated acute appendicitis.  The patient was admitted to the observation unit and placed on IV antibiotics.  She has been afebrile.  She has not taken any pain medicine overnight.  Her white count has normalized.    She has had a prior laparoscopic hysterectomy.    Review of Systems    Review of Systems   Constitutional: Positive for appetite change. Negative for chills.   Gastrointestinal: Positive for abdominal pain. Negative for vomiting.         History  Past Medical History:   Diagnosis Date   • Anxiety    • PMDD (premenstrual dysphoric disorder)    , History reviewed. No pertinent surgical history.,   Family History   Problem Relation Age of Onset   • Depression Father    • Post-traumatic stress disorder Father    • Anxiety disorder Father    ,   Social History     Tobacco Use   • Smoking status: Never   • Smokeless tobacco: Never   Vaping Use   • Vaping Use: Never used   Substance Use Topics   • Alcohol use: Yes     Alcohol/week: 2.0 standard  drinks     Types: 2 Glasses of wine per week     Comment: social   • Drug use: No   ,   Medications Prior to Admission   Medication Sig Dispense Refill Last Dose   • buPROPion SR (Wellbutrin SR) 100 MG 12 hr tablet Take 1 tablet by mouth 2 (Two) Times a Day. 180 tablet 1 4/21/2023   • clonazePAM (KlonoPIN) 0.5 MG tablet Take 1 tablet by mouth 2 (Two) Times a Day As Needed for Anxiety. 60 tablet 0 Past Month   • lamoTRIgine (LaMICtal) 25 MG tablet Take 1 tablet by mouth 2 (Two) Times a Day. (Patient taking differently: Take 1 tablet by mouth 2 (Two) Times a Day As Needed.) 180 tablet 1 Past Month   • azithromycin (Zithromax) 250 MG tablet Take 2 tablets the first day, then 1 tablet daily for 4 days. 6 tablet 0    , Scheduled Meds:  buPROPion SR, 100 mg, Oral, BID  ceFOXitin, 2 g, Intravenous, Q6H  metroNIDAZOLE, 500 mg, Intravenous, Q8H  sodium chloride, 10 mL, Intravenous, Q12H    , Continuous Infusions:  dextrose 5 % and sodium chloride 0.45 %, 100 mL/hr, Last Rate: 100 mL/hr (04/22/23 0918)    , PRN Meds:  •  acetaminophen  •  clonazePAM  •  melatonin  •  nitroglycerin  •  ondansetron  •  [COMPLETED] Insert Peripheral IV **AND** sodium chloride  •  [COMPLETED] Insert Peripheral IV **AND** sodium chloride  •  sodium chloride  •  sodium chloride and Allergies:  Penicillin g    Objective     Vital Signs   Temp:  [98.4 °F (36.9 °C)-98.9 °F (37.2 °C)] 98.4 °F (36.9 °C)  Heart Rate:  [77-97] 77  Resp:  [16-18] 16  BP: (124-152)/(81-93) 124/81    Physical Exam:       General Appearance:    Alert, cooperative, in no acute distress   Head:    Normocephalic, without obvious abnormality, atraumatic   Eyes:            Lids and lashes normal, conjunctivae and sclerae normal, no   icterus, no pallor, corneas clear   Ears:    Ears appear intact with no abnormalities noted   Lungs:    Breathing unlabored   Chest Wall:    No abnormalities observed   Abdomen:    Soft, tender in right lower quadrant with voluntary guarding, no  palpable masses or hernias   Extremities:   Moves all extremities well   Skin:   No bleeding, bruising or rash   Neurologic:   Cranial nerves 2 - 12 grossly intact, sensation intact       Results Review:  Lab Results (last 72 hours)     Procedure Component Value Units Date/Time    Basic Metabolic Panel [[id number]]  (Abnormal) Collected: 04/22/23 0515    Specimen: Blood Updated: 04/22/23 0730     Glucose 108 mg/dL      BUN 6 mg/dL      Creatinine 0.62 mg/dL      Sodium 140 mmol/L      Potassium 3.5 mmol/L      Chloride 105 mmol/L      CO2 24.0 mmol/L      Calcium 8.7 mg/dL      BUN/Creatinine Ratio 9.7     Anion Gap 11.0 mmol/L      eGFR 117.8 mL/min/1.73     Narrative:      GFR Normal >60  Chronic Kidney Disease <60  Kidney Failure <15      CBC Auto Differential [[id number]]  (Normal) Collected: 04/22/23 0515    Specimen: Blood Updated: 04/22/23 0702     WBC 9.30 10*3/mm3      RBC 4.49 10*6/mm3      Hemoglobin 13.5 g/dL      Hematocrit 40.0 %      MCV 89.2 fL      MCH 30.1 pg      MCHC 33.8 g/dL      RDW 13.1 %      RDW-SD 40.7 fl      MPV 8.9 fL      Platelets 241 10*3/mm3      Neutrophil % 65.3 %      Lymphocyte % 25.9 %      Monocyte % 6.9 %      Eosinophil % 1.5 %      Basophil % 0.4 %      Neutrophils, Absolute 6.10 10*3/mm3      Lymphocytes, Absolute 2.40 10*3/mm3      Monocytes, Absolute 0.60 10*3/mm3      Eosinophils, Absolute 0.10 10*3/mm3      Basophils, Absolute 0.00 10*3/mm3      nRBC 0.0 /100 WBC     Comprehensive Metabolic Panel [[id number]]  (Abnormal) Collected: 04/21/23 2001    Specimen: Blood Updated: 04/21/23 2031     Glucose 107 mg/dL      BUN 8 mg/dL      Creatinine 0.74 mg/dL      Sodium 139 mmol/L      Potassium 4.6 mmol/L      Comment: Slight hemolysis detected by analyzer. Results may be affected.        Chloride 103 mmol/L      CO2 24.0 mmol/L      Calcium 9.6 mg/dL      Total Protein 7.2 g/dL      Albumin 4.7 g/dL      ALT (SGPT) 19 U/L      AST (SGOT) 20 U/L      Alkaline Phosphatase 62 U/L       Total Bilirubin 0.3 mg/dL      Globulin 2.5 gm/dL      A/G Ratio 1.9 g/dL      BUN/Creatinine Ratio 10.8     Anion Gap 12.0 mmol/L      eGFR 107.0 mL/min/1.73     Narrative:      GFR Normal >60  Chronic Kidney Disease <60  Kidney Failure <15      Lipase [[id number]]  (Normal) Collected: 04/21/23 2001    Specimen: Blood Updated: 04/21/23 2031     Lipase 34 U/L     Urinalysis, Microscopic Only - Urine, Clean Catch [[id number]]  (Abnormal) Collected: 04/21/23 2002    Specimen: Urine, Clean Catch Updated: 04/21/23 2024     RBC, UA 0-2 /HPF      WBC, UA None Seen /HPF      Comment: Urine culture not indicated.        Bacteria, UA None Seen /HPF      Squamous Epithelial Cells, UA 7-12 /HPF      Hyaline Casts, UA None Seen /LPF      Methodology Manual Light Microscopy    Urinalysis With Culture If Indicated - Urine, Clean Catch [[id number]]  (Abnormal) Collected: 04/21/23 2002    Specimen: Urine, Clean Catch Updated: 04/21/23 2015     Color, UA Yellow     Appearance, UA Clear     pH, UA 7.0     Specific Gravity, UA <=1.005     Glucose, UA Negative     Ketones, UA Negative     Bilirubin, UA Negative     Blood, UA Trace     Protein, UA Negative     Leuk Esterase, UA Negative     Nitrite, UA Negative     Urobilinogen, UA 0.2 E.U./dL    Narrative:      In absence of clinical symptoms, the presence of pyuria, bacteria, and/or nitrites on the urinalysis result does not correlate with infection.    CBC & Differential [[id number]]  (Abnormal) Collected: 04/21/23 2001    Specimen: Blood Updated: 04/21/23 2011    Narrative:      The following orders were created for panel order CBC & Differential.  Procedure                               Abnormality         Status                     ---------                               -----------         ------                     CBC Auto Differential[[id number]]        Abnormal            Final result                 Please view results for these tests on the individual orders.    CBC Auto  Differential [[id number]]  (Abnormal) Collected: 04/21/23 2001    Specimen: Blood Updated: 04/21/23 2011     WBC 15.20 10*3/mm3      RBC 4.77 10*6/mm3      Hemoglobin 14.1 g/dL      Hematocrit 42.7 %      MCV 89.4 fL      MCH 29.6 pg      MCHC 33.1 g/dL      RDW 13.1 %      RDW-SD 40.7 fl      MPV 8.4 fL      Platelets 276 10*3/mm3      Neutrophil % 80.3 %      Lymphocyte % 12.7 %      Monocyte % 5.9 %      Eosinophil % 0.6 %      Basophil % 0.5 %      Neutrophils, Absolute 12.20 10*3/mm3      Lymphocytes, Absolute 1.90 10*3/mm3      Monocytes, Absolute 0.90 10*3/mm3      Eosinophils, Absolute 0.10 10*3/mm3      Basophils, Absolute 0.10 10*3/mm3      nRBC 0.0 /100 WBC         Imaging Results (Last 72 Hours)     Procedure Component Value Units Date/Time    CT Abdomen Pelvis With Contrast [[id number]] Collected: 04/21/23 2125     Updated: 04/21/23 2131    Narrative:      CT ABDOMEN PELVIS W CONTRAST    Date of Exam: 4/21/2023 8:57 PM EDT    Indication: rlq pain.    Comparison: None available.    Technique: Axial CT images were obtained of the abdomen and pelvis following the uneventful intravenous administration of iodinated contrast. Sagittal and coronal reconstructions were performed.  Automated exposure control and iterative reconstruction   methods were used.        Findings:  The lung bases are clear.    The liver, gallbladder, adrenal glands, kidneys, spleen, and pancreas are unremarkable.    The stomach appears normal. The small bowel appears normal in caliber and configuration. The colon appears normal. The appendix is mildly enlarged at 7 mm with hyperemia, suggesting early developing appendicitis. There is mild pelvic ascites. There is no   loculated collection to suggest an abscess. No abnormally enlarged lymph nodes are identified.    The rectum and urinary bladder are unremarkable. The uterus is surgically absent.    No aggressive osseous lesions are identified.      Impression:      Impression:  1.Mildly  enlarged appendix with associated hyperemia, concerning for early developing appendicitis.  2.Mild pelvic ascites. No evidence of abscess.            Electronically Signed: Will Ismael    4/21/2023 9:29 PM EDT    Workstation ID: [id number]          I reviewed the patient's new clinical results.  I reviewed the patient's new imaging results and agree with the interpretation.  I reviewed the patient's other test results and agree with the interpretation      Assessment & Plan       Acute appendicitis      37-year-old lady with acute appendicitis    I discussed the pathogenesis of appendicitis with the patient.  I discussed operative versus nonoperative management with her.  I recommended that she undergo appendectomy for treatment.  She is agreeable to this.  The risks and benefits of laparoscopic appendectomy, possible open operation were discussed with her.  She notes that she does have a history of significant postoperative nausea and vomiting following her hysterectomy and this will be discussed with anesthesia.    I discussed the patient's findings and my recommendations with patient              This document has been electronically signed by Prasanna Costa MD on April 22, 2023 09:43 EDT      Prasanna Costa MD  04/22/23  09:43 EDT

## 2023-04-22 NOTE — ED PROVIDER NOTES
Subjective   History of Present Illness  Chief complaint: Patient is a pleasant 37-year-old female.  She presents with abdominal pain.  Its been in the right lower quadrant since this morning.  It started out not severe and has progressed and become severe through the day.  She states things like if she will have to cough to clear her throat or sudden movements will make it worse.  It is all localized to the right side and some in the right back.  If she does push to have a bowel movement or urinates she feels some discomfort as well.  She has not had loss of appetite to this point.  No fever that she notes.  She is never anything like this before.  She has had a hysterectomy in the past.  She has never had ovarian cyst.    Context:    Duration:    Timing: Started this morning    Severity: Severe    Associated Symptoms:        PCP:  LMP:        Review of Systems   Constitutional: Negative for fever.   Respiratory: Negative.    Cardiovascular: Negative.    Gastrointestinal: Positive for abdominal pain.   Genitourinary: Negative.    Musculoskeletal: Negative.        Past Medical History:   Diagnosis Date   • Anxiety    • PMDD (premenstrual dysphoric disorder)        Allergies   Allergen Reactions   • Penicillin G Rash       No past surgical history on file.    Family History   Problem Relation Age of Onset   • Depression Father    • Post-traumatic stress disorder Father    • Anxiety disorder Father        Social History     Socioeconomic History   • Marital status:    Tobacco Use   • Smoking status: Never   • Smokeless tobacco: Never   Substance and Sexual Activity   • Alcohol use: Yes     Alcohol/week: 2.0 standard drinks     Types: 2 Glasses of wine per week     Comment: social   • Drug use: No   • Sexual activity: Yes     Partners: Male     Birth control/protection: Surgical           Objective   Physical Exam  Vitals and nursing note reviewed.   Constitutional:       Appearance: She is well-developed.    HENT:      Head: Normocephalic and atraumatic.   Pulmonary:      Effort: Pulmonary effort is normal.      Breath sounds: Normal breath sounds.   Abdominal:      General: Abdomen is flat.      Palpations: Abdomen is soft.      Tenderness: There is abdominal tenderness in the right lower quadrant. There is no guarding or rebound.   Skin:     General: Skin is warm and dry.   Neurological:      General: No focal deficit present.      Mental Status: She is alert and oriented to person, place, and time.   Psychiatric:         Mood and Affect: Mood normal.         Behavior: Behavior normal.         Procedures           ED Course                                           MDM    Final diagnoses:   None       ED Disposition  ED Disposition     None          No follow-up provider specified.       Medication List      No changes were made to your prescriptions during this visit.        Ref Range    Lipase 34 13 - 60 U/L   Urinalysis With Culture If Indicated - Urine, Clean Catch    Specimen: Urine, Clean Catch   Result Value Ref Range    Color, UA Yellow Yellow, Straw    Appearance, UA Clear Clear    pH, UA 7.0 5.0 - 8.0    Specific Gravity, UA <=1.005 1.005 - 1.030    Glucose, UA Negative Negative    Ketones, UA Negative Negative    Bilirubin, UA Negative Negative    Blood, UA Trace (A) Negative    Protein, UA Negative Negative    Leuk Esterase, UA Negative Negative    Nitrite, UA Negative Negative    Urobilinogen, UA 0.2 E.U./dL 0.2 - 1.0 E.U./dL   CBC Auto Differential    Specimen: Blood   Result Value Ref Range    WBC 15.20 (H) 3.40 - 10.80 10*3/mm3    RBC 4.77 3.77 - 5.28 10*6/mm3    Hemoglobin 14.1 12.0 - 15.9 g/dL    Hematocrit 42.7 34.0 - 46.6 %    MCV 89.4 79.0 - 97.0 fL    MCH 29.6 26.6 - 33.0 pg    MCHC 33.1 31.5 - 35.7 g/dL    RDW 13.1 12.3 - 15.4 %    RDW-SD 40.7 37.0 - 54.0 fl    MPV 8.4 6.0 - 12.0 fL    Platelets 276 140 - 450 10*3/mm3    Neutrophil % 80.3 (H) 42.7 - 76.0 %    Lymphocyte % 12.7 (L) 19.6 - 45.3 %    Monocyte % 5.9 5.0 - 12.0 %    Eosinophil % 0.6 0.3 - 6.2 %    Basophil % 0.5 0.0 - 1.5 %    Neutrophils, Absolute 12.20 (H) 1.70 - 7.00 10*3/mm3    Lymphocytes, Absolute 1.90 0.70 - 3.10 10*3/mm3    Monocytes, Absolute 0.90 0.10 - 0.90 10*3/mm3    Eosinophils, Absolute 0.10 0.00 - 0.40 10*3/mm3    Basophils, Absolute 0.10 0.00 - 0.20 10*3/mm3    nRBC 0.0 0.0 - 0.2 /100 WBC   Urinalysis, Microscopic Only - Urine, Clean Catch    Specimen: Urine, Clean Catch   Result Value Ref Range    RBC, UA 0-2 (A) None Seen /HPF    WBC, UA None Seen None Seen /HPF    Bacteria, UA None Seen None Seen /HPF    Squamous Epithelial Cells, UA 7-12 (A) None Seen, 0-2 /HPF    Hyaline Casts, UA None Seen None Seen /LPF    Methodology Manual Light Microscopy    Basic Metabolic Panel    Specimen: Blood   Result Value Ref Range    Glucose 108 (H) 65 - 99 mg/dL    BUN 6 6 - 20 mg/dL    Creatinine  0.62 0.57 - 1.00 mg/dL    Sodium 140 136 - 145 mmol/L    Potassium 3.5 3.5 - 5.2 mmol/L    Chloride 105 98 - 107 mmol/L    CO2 24.0 22.0 - 29.0 mmol/L    Calcium 8.7 8.6 - 10.5 mg/dL    BUN/Creatinine Ratio 9.7 7.0 - 25.0    Anion Gap 11.0 5.0 - 15.0 mmol/L    eGFR 117.8 >60.0 mL/min/1.73   CBC Auto Differential    Specimen: Blood   Result Value Ref Range    WBC 9.30 3.40 - 10.80 10*3/mm3    RBC 4.49 3.77 - 5.28 10*6/mm3    Hemoglobin 13.5 12.0 - 15.9 g/dL    Hematocrit 40.0 34.0 - 46.6 %    MCV 89.2 79.0 - 97.0 fL    MCH 30.1 26.6 - 33.0 pg    MCHC 33.8 31.5 - 35.7 g/dL    RDW 13.1 12.3 - 15.4 %    RDW-SD 40.7 37.0 - 54.0 fl    MPV 8.9 6.0 - 12.0 fL    Platelets 241 140 - 450 10*3/mm3    Neutrophil % 65.3 42.7 - 76.0 %    Lymphocyte % 25.9 19.6 - 45.3 %    Monocyte % 6.9 5.0 - 12.0 %    Eosinophil % 1.5 0.3 - 6.2 %    Basophil % 0.4 0.0 - 1.5 %    Neutrophils, Absolute 6.10 1.70 - 7.00 10*3/mm3    Lymphocytes, Absolute 2.40 0.70 - 3.10 10*3/mm3    Monocytes, Absolute 0.60 0.10 - 0.90 10*3/mm3    Eosinophils, Absolute 0.10 0.00 - 0.40 10*3/mm3    Basophils, Absolute 0.00 0.00 - 0.20 10*3/mm3    nRBC 0.0 0.0 - 0.2 /100 WBC   ECG 12 Lead Chest Pain   Result Value Ref Range    QT Interval 368 ms   Tissue Pathology Exam    Specimen: Large Intestine, Appendix; Tissue   Result Value Ref Range    Case Report       Surgical Pathology Report                         Case: [id number]                                  Authorizing Provider:  Prasanna Costa,   Collected:           04/22/2023 11:13 AM                                 MD                                                                           Ordering Location:     [facility] MAIN  Received:            04/24/2023 06:29 AM                                 OR                                                                           Pathologist:           Will Diaz MD                                                            Specimen:    Large  "Intestine, Appendix, appendix                                                        Final Diagnosis       Appendix, appendectomy:    Acute appendicitis    [facility]       Gross Description       1. Large Intestine, Appendix.  Received in formalin designated \"Appendix\" is an appendectomy specimen measuring 4.5 x 0.8 cm. The serosa is tan and glistening; no exudate is identified. The appendix is sectioned revealing a patent lumen; no polyps or masses are identified. The entire appendix except for fat and staples is submitted in one cassette.    [facility]                                            Medical Decision Making  Patient was seen and evaluated for abdominal pain    Differential diagnosis includes was not limited to appendicitis, cholecystitis, small bowel obstruction, diverticulitis, UTI, pyelonephritis    Patient remained in no acute distress here in the emergency department.  She did have CT scan consistent with acute appendicitis.  No signs of cholecystitis bowel obstruction diverticulitis UTI or pyelonephritis.  I discussed the case with Dr. Costa on-call for surgery.  Patient was given IV antibiotics and placed in the ED observation unit.  He will see in consult the patient for definitive treatment of her acute appendicitis.  Discussed with patient.  She verbalized understanding.    Acute appendicitis, unspecified acute appendicitis type: acute illness or injury  Amount and/or Complexity of Data Reviewed  Labs: ordered.     Details: wbc 15    Radiology: ordered and independent interpretation performed.     Details: appendicitis reviewed by myself      Risk  Prescription drug management.  Decision regarding hospitalization.          Final diagnoses:   None   Acute appendicitis    ED Disposition  ED Disposition     None          No follow-up provider specified.       Medication List      No changes were made to your prescriptions during this visit.          Bryson Coughlin,   04/27/23 0758    "

## 2023-04-22 NOTE — OP NOTE
Operative Note    Marjorie Montague  4/22/2023    Pre-op Diagnosis:   Acute Appendicitis    Post-op Diagnosis:     Acute Appendicitis    Procedure/CPT® Codes:      Procedure(s):  APPENDECTOMY LAPAROSCOPIC    Surgeon(s):  Prasanna Costa MD    Anesthesia: General    Staff:   Circulator: Lisbeth Shepherd RN CSA  Scrub Person: Bertha Yuen LPN    Estimated Blood Loss: minimal    Specimens:                ID Type Source Tests Collected by Time   A : appendix Tissue Large Intestine, Appendix TISSUE PATHOLOGY EXAM Prasanna Costa MD 4/22/2023 1113         Drains:   [REMOVED] Urethral Catheter Silicone 16 Fr. (Removed)       Findings: Acute appendicitis without evidence of perforation, some murky fluid in the pelvis which was evacuated away    Complications: None    Indication: Acute appendicitis    Operative Note:    Patient was seen and consented preoperatively.  Following this she was taken to the operating room and placed in supine position on the OR table.  General anesthetic was administered and the patient was orotracheally intubated without incident.  Perez catheter was placed sterilely by nursing.  A preoperative briefing was performed.  The abdomen was prepped and draped.  A timeout was then performed.    Following time out local anesthetic was injected below the patient's umbilicus.  A curvilinear incision was then made below the umbilicus and sharply carried down to the junction of the umbilical stalk with the abdominal wall fascia.  The the fascia at the base of the umbilical stalk was then sharply opened and the abdominal cavity was entered bluntly without difficulty.  A 5 mm port was then placed through this defect and used to insufflate the abdomen without issue.  The area below trocar insertion was inspected and found to be without injury.  On examination of the right lower quadrant there appeared to be murky fluid in the pelvis, consistent with appendicitis.    The patient was placed  in moderate Trendelenburg position.  In the low midline of the abdomen additional local anesthetic was injected and a 5 mm port was placed in this position.  The camera was then moved to this port to allow for upsizing of the umbilical port to a 12 mm port.  This 5 mm port was then placed in the left lower quadrant after injection of local and under direct visualization as well.    The camera was then moved to the left lower quadrant port.  Graspers were introduced and used to roll the terminal ileum and cecum medially revealing the appendiceal base.  This was grasped and the body of the appendix was elevated upward.  It appeared to be acutely inflamed but nonruptured.  A LigaSure device was then used to divide the mesoappendix all the way up to the base of the appendix.  A stapler was then passed across the base of the appendix and used to divide it away from the cecum.  The appendix was then placed into a bag and retrieved through the umbilical port site.    The operative field was visualized and appeared to be hemostatic.  The fluid within the pelvis was then evacuated away using the suction .  The umbilical port was then removed and the fascial defect closed with a figure-of-eight 0 Vicryl stitch.  The abdomen was then briefly reinsufflated and the fascial closure checked and found to be adequate.  The abdomen was then completely desufflated.  The remaining 5 mm ports were removed.  The skin was closed with 4-0 Vicryl at all incision sites and covered with Skin Affix.  The Perez catheter was then removed and the patient was awakened and returned to recovery in good condition.            This document has been electronically signed by Prasanna Costa MD on April 22, 2023 11:27 EDT        Prasanna Costa MD     Date: 4/22/2023  Time: 11:26 EDT

## 2023-04-22 NOTE — ANESTHESIA POSTPROCEDURE EVALUATION
Patient: Marjorie Montague    Procedure Summary     Date: 04/22/23 Room / Location: [facility] OR 07 / [facility] MAIN OR    Anesthesia Start: 1029 Anesthesia Stop: 1135    Procedure: APPENDECTOMY LAPAROSCOPIC (Abdomen) Diagnosis:     Surgeons: Prasanna Costa MD Provider: Efra Guevara MD    Anesthesia Type: general ASA Status: 2          Anesthesia Type: general    Vitals  Vitals Value Taken Time   /58 04/22/23 1150   Temp 97.2 °F (36.2 °C) 04/22/23 1133   Pulse 59 04/22/23 1152   Resp 16 04/22/23 1140   SpO2 100 % 04/22/23 1152   Vitals shown include unvalidated device data.        Post Anesthesia Care and Evaluation    Patient location during evaluation: PACU  Patient participation: complete - patient participated  Level of consciousness: awake  Pain scale: See nurse's notes for pain score.  Pain management: adequate    Airway patency: patent  Anesthetic complications: No anesthetic complications  PONV Status: none  Cardiovascular status: acceptable  Respiratory status: acceptable and spontaneous ventilation  Hydration status: acceptable    Comments: Patient seen and examined postoperatively; vital signs stable; SpO2 greater than or equal to 90%; cardiopulmonary status stable; nausea/vomiting adequately controlled; pain adequately controlled; no apparent anesthesia complications; patient discharged from anesthesia care when discharge criteria were met

## 2023-04-22 NOTE — ANESTHESIA PREPROCEDURE EVALUATION
Anesthesia Evaluation     Patient summary reviewed and Nursing notes reviewed   NPO Solid Status: > 8 hours  NPO Liquid Status: > 8 hours           Airway   Mallampati: II  TM distance: >3 FB  Neck ROM: full  No difficulty expected  Dental - normal exam     Pulmonary - negative pulmonary ROS and normal exam   Cardiovascular - negative cardio ROS and normal exam        Neuro/Psych  (+) psychiatric history Anxiety,    GI/Hepatic/Renal/Endo - negative ROS     Musculoskeletal (-) negative ROS    Abdominal  - normal exam    Bowel sounds: normal.   Substance History - negative use     OB/GYN negative ob/gyn ROS         Other                        Anesthesia Plan    ASA 2     general     intravenous induction     Anesthetic plan, risks, benefits, and alternatives have been provided, discussed and informed consent has been obtained with: patient.  Pre-procedure education provided  Plan discussed with CRNA.        CODE STATUS:    Level Of Support Discussed With: Patient  Code Status (Patient has no pulse and is not breathing): CPR (Attempt to Resuscitate)  Medical Interventions (Patient has pulse or is breathing): Full Support

## 2023-04-22 NOTE — NURSING NOTE
"Pt c/o SOB and unable to take a deep breath, feels like \"something is sitting on her chest\".  STAT ekg and xray ordered.  Educated pt on gases used to fill the abd to perform ap lappy and how it can cause full filling and pain in the shoulder. Results to follow. Will continue to monitor.   "

## 2023-04-22 NOTE — PLAN OF CARE
Goal Outcome Evaluation:  Plan of Care Reviewed With: patient           Outcome Evaluation: discharge

## 2023-04-24 ENCOUNTER — TELEPHONE (OUTPATIENT)
Dept: SURGERY | Facility: CLINIC | Age: 38
End: 2023-04-24
Payer: COMMERCIAL

## 2023-04-25 LAB
LAB AP CASE REPORT: NORMAL
PATH REPORT.FINAL DX SPEC: NORMAL
PATH REPORT.GROSS SPEC: NORMAL

## 2023-05-10 ENCOUNTER — OFFICE VISIT (OUTPATIENT)
Dept: SURGERY | Facility: CLINIC | Age: 38
End: 2023-05-10
Payer: COMMERCIAL

## 2023-05-10 VITALS
BODY MASS INDEX: 28.68 KG/M2 | OXYGEN SATURATION: 100 % | SYSTOLIC BLOOD PRESSURE: 145 MMHG | WEIGHT: 168 LBS | TEMPERATURE: 97.7 F | DIASTOLIC BLOOD PRESSURE: 89 MMHG | HEART RATE: 73 BPM | HEIGHT: 64 IN

## 2023-05-10 DIAGNOSIS — Z09 ENCOUNTER FOR FOLLOW-UP: Primary | ICD-10-CM

## 2023-05-10 NOTE — PROGRESS NOTES
CHIEF COMPLAINT:    Chief Complaint   Patient presents with   • PO lap appy       HISTORY OF PRESENT ILLNESS:    Marjorie Montague is a 37 y.o. female who underwent laparoscopic appendectomy on 4/22/2023 for acute appendicitis.  Pathology confirmed acute appendicitis.  This benign result was discussed with her today.  Overall she feels quite well, in fact she almost canceled her appointment today as she has essentially no concerns.  She notes that she did apparently have a yeast infection after her hospital discharge and this was treated with oral Diflucan as well as probiotics. Is eating and drinking well at home, having regular bowel function. Reports no fevers or chills.      EXAM:  Vitals:    05/10/23 1433   BP: 145/89   Pulse: 73   Temp: 97.7 °F (36.5 °C)   SpO2: 100%         Abdomen soft, incisions well-healed    ASSESSMENT:    Status post laparoscopic appendectomy    PLAN:    Overall doing well.  Can gradually return to normal activities.  See us as needed.          This document has been electronically signed by Prasanna Costa MD on May 10, 2023 16:18 EDT

## 2024-10-31 ENCOUNTER — HOSPITAL ENCOUNTER (OUTPATIENT)
Dept: ULTRASOUND IMAGING | Facility: HOSPITAL | Age: 39
Discharge: HOME | End: 2024-10-31
Payer: COMMERCIAL

## 2024-10-31 ENCOUNTER — HOSPITAL ENCOUNTER (OUTPATIENT)
Dept: MAMMOGRAPHY | Facility: HOSPITAL | Age: 39
Discharge: HOME | End: 2024-10-31
Payer: COMMERCIAL

## 2024-10-31 DIAGNOSIS — N63.0 LUMP IN FEMALE BREAST: ICD-10-CM

## 2024-10-31 PROCEDURE — G0279 TOMOSYNTHESIS, MAMMO: HCPCS

## 2024-10-31 PROCEDURE — 76642 ULTRASOUND BREAST LIMITED: CPT

## 2024-10-31 PROCEDURE — 77066 DX MAMMO INCL CAD BI: CPT

## (undated) DEVICE — ENDOPATH ETS-FLEX45 ARTICULATING ENDOSCOPIC LINEAR CUTTER, NO RELOAD: Brand: ENDOPATH

## (undated) DEVICE — MARYLAND JAW LAPAROSCOPIC SEALER/DIVIDER COATED: Brand: LIGASURE

## (undated) DEVICE — CUFF SCD HEMOFORCE SEQ CALF STD MD

## (undated) DEVICE — 3M™ UNIVERSAL ELECTROSURGICAL PAD, SPLIT, WITH 15 FT CORD 9165L: Brand: 3M™

## (undated) DEVICE — SUT VIC 0 UR6 27IN VCP603H

## (undated) DEVICE — TOTAL TRAY, DB, 100% SILI FOLEY, 16FR 10: Brand: MEDLINE

## (undated) DEVICE — GLV SURG BIOGEL LTX PF 7 1/2

## (undated) DEVICE — SOL IRR NACL 0.9PCT 1000ML

## (undated) DEVICE — ADHS SKIN PREMIERPRO EXOFIN TOPICAL HI/VISC .5ML

## (undated) DEVICE — KT SURG TURNOVER 050

## (undated) DEVICE — ENDOPATH XCEL WITH OPTIVIEW TECHNOLOGY BLADELESS TROCARS WITH STABILITY SLEEVES: Brand: ENDOPATH XCEL OPTIVIEW

## (undated) DEVICE — ENDOPATH XCEL WITH OPTIVIEW TECHNOLOGY UNIVERSAL TROCAR STABILITY SLEEVES: Brand: ENDOPATH XCEL OPTIVIEW

## (undated) DEVICE — ENDOPATH XCEL BLADELESS TROCARS WITH STABILITY SLEEVES: Brand: ENDOPATH XCEL

## (undated) DEVICE — GENERAL LAPAROSCOPY CDS: Brand: MEDLINE INDUSTRIES, INC.

## (undated) DEVICE — CVR HNDL LT SURG ACCSSRY BLU STRL

## (undated) DEVICE — INSUFFLATION TUBING SET, ENDOFLATOR 50: Brand: N.A.

## (undated) DEVICE — SOL NACL 0.9PCT 1000ML

## (undated) DEVICE — UNDERGLV SURG BIOGEL INDICAT PF 8 GRN

## (undated) DEVICE — ENDOPOUCH RETRIEVER SPECIMEN RETRIEVAL BAGS: Brand: ENDOPOUCH RETRIEVER

## (undated) DEVICE — ANTIBACTERIAL UNDYED BRAIDED (POLYGLACTIN 910), SYNTHETIC ABSORBABLE SUTURE: Brand: COATED VICRYL

## (undated) DEVICE — 2, DISPOSABLE SUCTION/IRRIGATOR WITH DISPOSABLE TIP: Brand: STRYKEFLOW